# Patient Record
Sex: FEMALE | Race: WHITE | Employment: OTHER | URBAN - METROPOLITAN AREA
[De-identification: names, ages, dates, MRNs, and addresses within clinical notes are randomized per-mention and may not be internally consistent; named-entity substitution may affect disease eponyms.]

---

## 2017-01-25 RX ORDER — VENLAFAXINE HYDROCHLORIDE 150 MG/1
CAPSULE, EXTENDED RELEASE ORAL
Qty: 90 CAPSULE | Refills: 2 | Status: SHIPPED | OUTPATIENT
Start: 2017-01-25 | End: 2017-10-22 | Stop reason: SDUPTHER

## 2017-03-24 ENCOUNTER — TELEPHONE (OUTPATIENT)
Dept: NEUROLOGY | Age: 67
End: 2017-03-24

## 2017-07-31 ENCOUNTER — OFFICE VISIT (OUTPATIENT)
Dept: NEUROLOGY | Age: 67
End: 2017-07-31
Payer: MEDICARE

## 2017-07-31 VITALS
HEIGHT: 68 IN | HEART RATE: 72 BPM | DIASTOLIC BLOOD PRESSURE: 83 MMHG | SYSTOLIC BLOOD PRESSURE: 136 MMHG | WEIGHT: 142 LBS | BODY MASS INDEX: 21.52 KG/M2

## 2017-07-31 DIAGNOSIS — G35 RELAPSING REMITTING MULTIPLE SCLEROSIS (HCC): Primary | ICD-10-CM

## 2017-07-31 PROCEDURE — 99214 OFFICE O/P EST MOD 30 MIN: CPT | Performed by: PSYCHIATRY & NEUROLOGY

## 2017-08-09 ENCOUNTER — HOSPITAL ENCOUNTER (OUTPATIENT)
Dept: MRI IMAGING | Facility: CLINIC | Age: 67
Discharge: HOME | End: 2017-08-09
Payer: MEDICARE

## 2017-08-09 DIAGNOSIS — G35 RELAPSING REMITTING MULTIPLE SCLEROSIS (HCC): ICD-10-CM

## 2017-08-09 LAB — POC CREATININE: 0.7 MG/DL (ref 0.6–1.4)

## 2017-08-09 PROCEDURE — 82565 ASSAY OF CREATININE: CPT

## 2017-08-09 PROCEDURE — A9579 GAD-BASE MR CONTRAST NOS,1ML: HCPCS | Performed by: PSYCHIATRY & NEUROLOGY

## 2017-08-09 PROCEDURE — 70553 MRI BRAIN STEM W/O & W/DYE: CPT

## 2017-08-09 PROCEDURE — 72156 MRI NECK SPINE W/O & W/DYE: CPT

## 2017-08-09 PROCEDURE — 6360000004 HC RX CONTRAST MEDICATION: Performed by: PSYCHIATRY & NEUROLOGY

## 2017-08-09 RX ADMIN — GADOPENTETATE DIMEGLUMINE 13 ML: 469.01 INJECTION INTRAVENOUS at 09:50

## 2017-08-30 RX ORDER — INTERFERON BETA-1A 30MCG/.5ML
KIT INTRAMUSCULAR
Qty: 1 EACH | Refills: 11 | Status: SHIPPED | OUTPATIENT
Start: 2017-08-30 | End: 2018-02-19 | Stop reason: SDUPTHER

## 2017-09-18 PROBLEM — N39.0 RECURRENT UTI: Status: ACTIVE | Noted: 2017-09-18

## 2017-10-09 ENCOUNTER — OFFICE VISIT (OUTPATIENT)
Dept: NEUROLOGY | Age: 67
End: 2017-10-09
Payer: MEDICARE

## 2017-10-09 VITALS
SYSTOLIC BLOOD PRESSURE: 134 MMHG | HEIGHT: 68 IN | WEIGHT: 142 LBS | DIASTOLIC BLOOD PRESSURE: 79 MMHG | HEART RATE: 76 BPM | BODY MASS INDEX: 21.52 KG/M2

## 2017-10-09 DIAGNOSIS — G35 RELAPSING REMITTING MULTIPLE SCLEROSIS (HCC): Primary | ICD-10-CM

## 2017-10-09 PROCEDURE — 99213 OFFICE O/P EST LOW 20 MIN: CPT | Performed by: PSYCHIATRY & NEUROLOGY

## 2017-10-09 NOTE — PROGRESS NOTES
HPI:        Your patient, Danisha Barber returns for ongoing neurologic management of her long-standing relapsing remitting multiple sclerosis. She uses Avonex disease modifying therapy (DMT) which continues to control her condition very well. There have been no serious side effects from the medicine nor any recent relapses or signs of disease progression. Her most recent MRI of the brain from August 2017 revealed multiple T2 FLAIR hyperintense lesions within the supratentorial compartment compatible the clinical diagnosis of multiple sclerosis but compared to 2014 no new lesion or associated contrast enhancement or restricted diffusion was seen. A MRI of the cervical spine also from August 2017 revealed T2 hyperintense lesions which appeared similar to the prior exam with no new lesion or associated enhancement but degenerative changes contribute to minimal spinal canal stenosis at C6-C7. Rather than making a change in immunosuppressive therapy she wanted to give it some time and time has been good to her in that there have been no symptoms or features of active disease even by recent MR imaging. Nonetheless we always talk about the newer agents from the oral medications to stem cell therapy to the recent B-cell directed therapy, Ocrelizumab. She also uses symptomatic medicine, particularly Oxybutynin for a longstanding neurogenic bladder which still serves her purposes well. There have been no other medical or surgical issues nor any recent trauma, infection/fever or intoxication to complicate her multiple sclerosis condition.                                     REVIEW OF SYSTEMS    CONSTITUTIONAL Weight: absent, Appetite: absent, Fatigue: present       HEENT Ears: normal, Eyes: glasses and contacts, Visual disturbance: absent   RESPIRATORY Shortness of breath: absent, Cough: absent   CARDIOVASCULAR Chest pain: absent, Leg swelling :absent      GI Constipation: present, Diarrhea: absent, Swallowing change: absent       Urinary frequency: absent, Urinary urgency: absent, Urinary incontinence: absent   MUSCULOSKELETAL Neck pain: absent, Back pain: absent, Stiffness: absent, Muscle pain: absent, Joint pain: absent,  Restless legs: absent   DERMATOLOGIC Hair loss: absent, Skin changes: absent   NEUROLOGIC Memory loss: absent, Confusion: absent, Seizures: absent Trouble walking or imbalance: present, Dizziness: absent, Weakness: present, Numbness: present, Tremor: absent, Spasm: absent, Speech difficulty: absent, Headache: present, Light sensitivity: absent   PSYCHIATRIC Anxiety: absent, Hallucination: absent, Mood disorder: absent   HEMATOLOGIC Abnormal bleeding: absent, Anemia: absent, Clotting disorder: absent, Lymph gland changes: absent                   Outpatient Prescriptions Marked as Taking for the 10/9/17 encounter (Office Visit) with Rosana Murry MD   Medication Sig Dispense Refill    oxybutynin (DITROPAN-XL) 10 MG extended release tablet Take 1 tablet by mouth daily 90 tablet 3    AVONEX PEN 30 MCG/0.5ML AJKT INJECT 0.5 ML INTRAMUSCULARLY ONCE A WEEK 1 each 11    venlafaxine (EFFEXOR XR) 150 MG extended release capsule TAKE 1 CAPSULE DAILY 90 capsule 2    levothyroxine (SYNTHROID) 150 MCG tablet Take 1 tablet by mouth daily.  guaiFENesin (MUCINEX) 600 MG SR tablet Take 1,200 mg by mouth 2 times daily.  Cranberry 250 MG TABS Take  by mouth 2 times daily. Takes Cranactin from [facility] [place] ordered by urology                                          PHYSICAL EXAMINATION                                              .                                                                                                     General Appearance:  Alert, cooperative, no signs of distress, appears stated age and fit for age, well dressed and groomed   Head:  Normocephalic, no signs of trauma   Eyes:  Conjunctiva/corneas clear;  eyelids intact   Ears:  Normal external ear and canals   Nose: Nares normal, mucosa

## 2017-10-09 NOTE — COMMUNICATION BODY
change: absent       Urinary frequency: absent, Urinary urgency: absent, Urinary incontinence: absent   MUSCULOSKELETAL Neck pain: absent, Back pain: absent, Stiffness: absent, Muscle pain: absent, Joint pain: absent,  Restless legs: absent   DERMATOLOGIC Hair loss: absent, Skin changes: absent   NEUROLOGIC Memory loss: absent, Confusion: absent, Seizures: absent Trouble walking or imbalance: present, Dizziness: absent, Weakness: present, Numbness: present, Tremor: absent, Spasm: absent, Speech difficulty: absent, Headache: present, Light sensitivity: absent   PSYCHIATRIC Anxiety: absent, Hallucination: absent, Mood disorder: absent   HEMATOLOGIC Abnormal bleeding: absent, Anemia: absent, Clotting disorder: absent, Lymph gland changes: absent                   Outpatient Prescriptions Marked as Taking for the 10/9/17 encounter (Office Visit) with Eduar Desai MD   Medication Sig Dispense Refill    oxybutynin (DITROPAN-XL) 10 MG extended release tablet Take 1 tablet by mouth daily 90 tablet 3    AVONEX PEN 30 MCG/0.5ML AJKT INJECT 0.5 ML INTRAMUSCULARLY ONCE A WEEK 1 each 11    venlafaxine (EFFEXOR XR) 150 MG extended release capsule TAKE 1 CAPSULE DAILY 90 capsule 2    levothyroxine (SYNTHROID) 150 MCG tablet Take 1 tablet by mouth daily.  guaiFENesin (MUCINEX) 600 MG SR tablet Take 1,200 mg by mouth 2 times daily.  Cranberry 250 MG TABS Take  by mouth 2 times daily. Takes Cranactin from [facility] [place] ordered by urology                                          PHYSICAL EXAMINATION                                              .                                                                                                     General Appearance:  Alert, cooperative, no signs of distress, appears stated age and fit for age, well dressed and groomed   Head:  Normocephalic, no signs of trauma   Eyes:  Conjunctiva/corneas clear;  eyelids intact   Ears:  Normal external ear and canals   Nose: Nares normal, mucosa well.    There is no need for additional neurologic investigation or a change in therapy as far she is concerned. Consequently, I will remain available for questions or new neurologic issues that may arise and she will return for reevaluation in about 6 months time.

## 2017-10-23 RX ORDER — VENLAFAXINE HYDROCHLORIDE 150 MG/1
CAPSULE, EXTENDED RELEASE ORAL
Qty: 90 CAPSULE | Refills: 2 | Status: SHIPPED | OUTPATIENT
Start: 2017-10-23 | End: 2018-07-20 | Stop reason: SDUPTHER

## 2018-02-19 DIAGNOSIS — G35 RELAPSING REMITTING MULTIPLE SCLEROSIS (HCC): Primary | ICD-10-CM

## 2018-04-19 ENCOUNTER — OFFICE VISIT (OUTPATIENT)
Dept: NEUROLOGY | Age: 68
End: 2018-04-19
Payer: MEDICARE

## 2018-04-19 VITALS
HEART RATE: 76 BPM | HEIGHT: 68 IN | SYSTOLIC BLOOD PRESSURE: 147 MMHG | BODY MASS INDEX: 22.01 KG/M2 | DIASTOLIC BLOOD PRESSURE: 80 MMHG | WEIGHT: 145.2 LBS

## 2018-04-19 DIAGNOSIS — R53.83 OTHER FATIGUE: ICD-10-CM

## 2018-04-19 DIAGNOSIS — G35 RELAPSING REMITTING MULTIPLE SCLEROSIS (HCC): Primary | ICD-10-CM

## 2018-04-19 DIAGNOSIS — N31.9 NEUROGENIC BLADDER: ICD-10-CM

## 2018-04-19 DIAGNOSIS — F33.42 RECURRENT MAJOR DEPRESSIVE DISORDER, IN FULL REMISSION (HCC): ICD-10-CM

## 2018-04-19 PROCEDURE — 99215 OFFICE O/P EST HI 40 MIN: CPT | Performed by: PSYCHIATRY & NEUROLOGY

## 2018-04-30 LAB — VITAMIN D 1,25-DIHYDROXY: 18.6 PG/ML (ref 20–82)

## 2018-05-01 DIAGNOSIS — G35 RELAPSING REMITTING MULTIPLE SCLEROSIS (HCC): ICD-10-CM

## 2018-06-26 ENCOUNTER — TELEPHONE (OUTPATIENT)
Dept: NEUROLOGY | Age: 68
End: 2018-06-26

## 2018-06-26 DIAGNOSIS — G35 RELAPSING REMITTING MULTIPLE SCLEROSIS (HCC): ICD-10-CM

## 2018-06-26 DIAGNOSIS — E55.9 VITAMIN D DEFICIENCY: Primary | ICD-10-CM

## 2018-06-27 ENCOUNTER — TELEPHONE (OUTPATIENT)
Dept: NEUROLOGY | Age: 68
End: 2018-06-27

## 2018-06-27 NOTE — TELEPHONE ENCOUNTER
I had to do a PA for the Vit D3 50,000 units. It came back that it is not a covered medication with pt's insurance. Please advise, thanks.

## 2018-06-28 NOTE — TELEPHONE ENCOUNTER
That is the medication that I did the PA on, and it is coming back that it is not a covered medication.

## 2018-07-20 RX ORDER — VENLAFAXINE HYDROCHLORIDE 150 MG/1
CAPSULE, EXTENDED RELEASE ORAL
Qty: 90 CAPSULE | Refills: 0 | Status: SHIPPED | OUTPATIENT
Start: 2018-07-20 | End: 2020-08-24 | Stop reason: SINTOL

## 2018-07-26 DIAGNOSIS — G35 RELAPSING REMITTING MULTIPLE SCLEROSIS (HCC): ICD-10-CM

## 2018-08-29 ENCOUNTER — HOSPITAL ENCOUNTER (OUTPATIENT)
Age: 68
Setting detail: SPECIMEN
Discharge: HOME | End: 2018-08-29
Payer: MEDICARE

## 2018-08-31 LAB
CULTURE: ABNORMAL
CULTURE: ABNORMAL
Lab: ABNORMAL
ORGANISM: ABNORMAL
ORGANISM: ABNORMAL
SPECIMEN DESCRIPTION: ABNORMAL
STATUS: ABNORMAL

## 2018-11-13 ENCOUNTER — OFFICE VISIT (OUTPATIENT)
Dept: NEUROLOGY | Age: 68
End: 2018-11-13
Payer: MEDICARE

## 2018-11-13 VITALS
HEART RATE: 64 BPM | HEIGHT: 68 IN | BODY MASS INDEX: 21.92 KG/M2 | SYSTOLIC BLOOD PRESSURE: 162 MMHG | DIASTOLIC BLOOD PRESSURE: 88 MMHG | WEIGHT: 144.6 LBS

## 2018-11-13 DIAGNOSIS — E55.9 VITAMIN D DEFICIENCY: ICD-10-CM

## 2018-11-13 DIAGNOSIS — G35 RELAPSING REMITTING MULTIPLE SCLEROSIS (HCC): Primary | ICD-10-CM

## 2018-11-13 DIAGNOSIS — F33.42 RECURRENT MAJOR DEPRESSIVE DISORDER, IN FULL REMISSION (HCC): ICD-10-CM

## 2018-11-13 DIAGNOSIS — N31.9 NEUROGENIC BLADDER: ICD-10-CM

## 2018-11-13 DIAGNOSIS — R53.83 OTHER FATIGUE: ICD-10-CM

## 2018-11-13 PROCEDURE — 99214 OFFICE O/P EST MOD 30 MIN: CPT | Performed by: PSYCHIATRY & NEUROLOGY

## 2018-11-13 NOTE — PROGRESS NOTES
NEUROLOGY FOLLOW-UP  Patient Name:  Meliton Chand  :   1950  Clinic Visit Date: 2018        REVIEW OF SYSTEMS  Constitutional Weight changes: absent, Fevers : absent, Fatigue: present; Any recent hospitalizations:  absent.    HEENT Ears: normal, Eyes: glasses and contacts, Visual disturbance: absent   Reespiratory Shortness of breath: absent, Cough: absent   Cardivascular Chest pain: absent, Leg swelling :absent   GI Constipation: present, Diarrhea: absent, Swallowing change: absent    Urinary frequency: absent, Urinary urgency: absent, Urinary incontinence: absent   Musculoskeletal Neck pain: absent, Back pain: absent, Stiffness: absent, Muscle pain: absent, Joint pain: absent   Dermatological Hair loss: absent, Skin changes: absent   Neurological Memory loss: absent, Confusion: absent, Seizures: absent; Trouble walking or imbalance: present, Dizziness: absent, Weakness: present, Numbness present, Tremor: absent, Spasm: absent, Speech difficulty: absent, Headache: absent, Light sensitivity: absent   Psychiatric Anxiety: absent, Depression  absent, Suicidal ideations absent   Hematologic Abnormal bleeding: absent, Anemia: absent, Clotting disorder: absent, Lymph gland changes: absent
medulla, cervical cord at level of C2 and C4 vertebral bodies, at level of C2-C3, C5-C6 and C6-C7 disks as well as small enhancing MS plaque in posterior aspect of the cord at level of T1-T2 disc; also mild broad-based disc protrusions at C5-C6 and C6-C7 about the anterior contour of the cord and lead to mild spinal canal stenosis. MRI cervical spine August 2017 revealed T2 hyperintense lesions which appear similar to prior exam with no new lesions or associated enhancement but degenerative changes contributing to minimal spinal canal stenosis at C6-C7. Review of systems done by staff, Lyndsay Moss reviewed and pertinent positives include gait imbalance, weakness, spasms and fatigue, etc.    Current Outpatient Prescriptions on File Prior to Visit   Medication Sig Dispense Refill    oxybutynin (DITROPAN-XL) 10 MG extended release tablet TAKE 1 TABLET DAILY 90 tablet 3    Interferon Beta-1a (AVONEX PEN) 30 MCG/0.5ML AJKT INJECT 0.5 ML INTRAMUSCULARLY ONCE A WEEK 1 each 6    venlafaxine (EFFEXOR XR) 150 MG extended release capsule TAKE 1 CAPSULE DAILY 90 capsule 0    Cholecalciferol 400 UNIT TABS tablet Take 2 tablets by mouth daily Starting August 20th 2018. 30 tablet 3    levothyroxine (SYNTHROID) 150 MCG tablet Take 1 tablet by mouth daily.  guaiFENesin (MUCINEX) 600 MG SR tablet Take 1,200 mg by mouth 2 times daily.  Cranberry 250 MG TABS Take  by mouth 2 times daily. Takes Cranactin from [facility] ordered by urology       No current facility-administered medications on file prior to visit. Allergies: Barbara Raymond has No Known Allergies. Past Medical History:   Diagnosis Date    Caffeine use     1 coffee/day    Constipation     Depression, major, recurrent ([address]     Treated with EFFEXOR.  Hypothyroid 1996    Thyroid cancer surgically removed. Treated with LEVOXYL.     Irregular menses     resolved    Migraine with aura     Remote problem    Mononucleosis 1971    Caused Erythema

## 2019-08-02 ENCOUNTER — OFFICE VISIT (OUTPATIENT)
Dept: NEUROLOGY | Age: 69
End: 2019-08-02
Payer: MEDICARE

## 2019-08-02 VITALS
HEIGHT: 68 IN | HEART RATE: 66 BPM | WEIGHT: 138.2 LBS | SYSTOLIC BLOOD PRESSURE: 136 MMHG | BODY MASS INDEX: 20.95 KG/M2 | DIASTOLIC BLOOD PRESSURE: 79 MMHG

## 2019-08-02 DIAGNOSIS — N31.9 NEUROGENIC BLADDER: ICD-10-CM

## 2019-08-02 DIAGNOSIS — F33.42 RECURRENT MAJOR DEPRESSIVE DISORDER, IN FULL REMISSION (HCC): ICD-10-CM

## 2019-08-02 DIAGNOSIS — E55.9 VITAMIN D DEFICIENCY: ICD-10-CM

## 2019-08-02 DIAGNOSIS — G35 RELAPSING REMITTING MULTIPLE SCLEROSIS (HCC): Primary | ICD-10-CM

## 2019-08-02 PROCEDURE — 99214 OFFICE O/P EST MOD 30 MIN: CPT | Performed by: PSYCHIATRY & NEUROLOGY

## 2019-08-02 RX ORDER — THIAMINE HCL 100 MG
500 TABLET ORAL DAILY
COMMUNITY

## 2019-08-02 RX ORDER — LISINOPRIL 10 MG/1
10 TABLET ORAL DAILY
COMMUNITY
End: 2020-08-17

## 2019-08-02 RX ORDER — OXYBUTYNIN CHLORIDE 10 MG/1
TABLET, EXTENDED RELEASE ORAL
Qty: 90 TABLET | Refills: 3 | Status: SHIPPED | OUTPATIENT
Start: 2019-08-02 | End: 2019-08-19 | Stop reason: ALTCHOICE

## 2019-08-02 RX ORDER — LACTULOSE 20 G/30ML
SOLUTION ORAL
COMMUNITY
End: 2020-08-17

## 2019-08-06 LAB — VITAMIN D 25-HYDROXY: 51 NG/ML

## 2019-08-08 ENCOUNTER — TELEPHONE (OUTPATIENT)
Dept: NEUROLOGY | Age: 69
End: 2019-08-08

## 2020-02-04 ENCOUNTER — OFFICE VISIT (OUTPATIENT)
Dept: NEUROLOGY | Age: 70
End: 2020-02-04
Payer: MEDICARE

## 2020-02-04 VITALS
BODY MASS INDEX: 21.98 KG/M2 | WEIGHT: 145 LBS | DIASTOLIC BLOOD PRESSURE: 85 MMHG | HEART RATE: 70 BPM | SYSTOLIC BLOOD PRESSURE: 140 MMHG | HEIGHT: 68 IN

## 2020-02-04 PROCEDURE — 99214 OFFICE O/P EST MOD 30 MIN: CPT | Performed by: PSYCHIATRY & NEUROLOGY

## 2020-02-04 NOTE — PROGRESS NOTES
NEUROLOGY FOLLOW UP VISIT   Patient Name:       Burney Gilford  :        1950  Clinic Visit Date:    2020    Dear Dr. Laura Lott saw Ms. Burney Gilford in follow-up in the office today in continuation of neurologic care. As you know she  is a 81 y.o. right handed  female with relapsing remitting MS since  and on DMT with Avonex since ; patient never wanted to make any changes in her DMT. Now she comes to the clinic stating that she wants to know the status of multiple sclerosis. She still has ongoing gait difficulties and with altered sensation in both lower extremities waist down and it is unchanged. She still on daily doses of vitamin D. He admits to fatigue but she stated that she wants to be enrolled in a case [place] study and she does not want to be started on any medications at this point of time. She states that she tried amantadine and Ritalin in the past and she did not get much benefit. She denies blurred vision, double vision, weakness, vertigo, ataxia, memory disturbance. Multiple Sclerosis Disease Course  Onset of Symptoms: Date of onset:  (initial right optic neuritis along with lower extremity numbness and gait difficulties)   Date of diagnosis of MS:    Disease course at onset: Relapsing-Remitting  Current disease course: Relapsing-Remitting  Previous disease therapies: Avonex    Current disease therapy: Avonex ( -present)  CSF: done many yrs ago; complete details not available; old record notes say \"elevated IgG synthesis\". Vitamin D:  39.8 (2019)    Smoking status:  never smoked  EDSS: 2.5 with pyramidal dysfx & bladder dysfunction & gait disturbance    Of note; his symptoms of MS started in . She has had initial right optic neuritis along with lower extremity numbness and gait difficulties.   She has had abnormal VEP, MRI Brain and abnl CSF and then was diagnosed with relapsing remitting multiple sclerosis in   Her visual evoked potential testing in 1987 was abnormal on right side. Her spinal fluid studies from 1987 revealed increased IgG synthesis. She also had MRI brain in 90s with abnormal findings. Upon reviewing available studies;    MRI brain 12/2010 with plaque consistent with MS but no enhancing disease. MRI brain August 2017 revealed multiple T2 FLAIR hyperintense lesions within supratentorial compartment compatible with the clinical diagnosis of MS but compared to 2014 study; no new lesions or associated contrast enhancement or restricted diffusion seen. MRI cervical spine 1997 with C6 plaque. MRI C-spine 12/2014 with nonenhancing MS plaques in inferior medulla, cervical cord at level of C2 and C4 vertebral bodies, at level of C2-C3, C5-C6 and C6-C7 disks as well as small enhancing MS plaque in posterior aspect of the cord at level of T1-T2 disc; also mild broad-based disc protrusions at C5-C6 and C6-C7 about the anterior contour of the cord and lead to mild spinal canal stenosis. MRI cervical spine August 2017 revealed T2 hyperintense lesions which appear similar to prior exam with no new lesions or associated enhancement but degenerative changes contributing to minimal spinal canal stenosis at C6-C7. Review of systems done by staff reviewed and pertinent positives include balance difficulties, weakness, numbness, fatigue, etc. as stated above.       Current Outpatient Medications on File Prior to Visit   Medication Sig Dispense Refill    oxybutynin (DITROPAN-XL) 10 MG extended release tablet TAKE 1 TABLET DAILY 90 tablet 4    magnesium 200 MG TABS tablet Take 200 mg by mouth daily      COD LIVER OIL PO Take by mouth daily      Interferon Beta-1a (AVONEX PEN) 30 MCG/0.5ML AJKT INJECT 0.5 ML INTRAMUSCULARLY ONCE A WEEK 1 each 6    vitamin D (CHOLECALCIFEROL) 55601 UNIT CAPS Take 1 capsule by mouth once a week 8 capsule 0    venlafaxine (EFFEXOR XR) 150 MG extended release capsule TAKE 1 CAPSULE currently breastfeeding. GENERAL  Appears comfortable and in no distress   HEENT  NC/ AT   NECK  Supple and no bruits heard   MENTAL STATUS:  Alert, oriented, intact memory, no confusion, normal speech, normal language, no hallucination or delusion   CRANIAL NERVES: II     -      PERRLA, fundi reveal intact venous pulsations, visual fields intact to confrontation  III,IV,VI -  EOMs full, no afferent defect, no                      MAREK, no ptosis  V     -     Normal facial sensation  VII    -     Normal facial symmetry  VIII   -     Intact hearing  IX,X -     Symmetrical palate  XI    -     Symmetrical shoulder shrug  XII   -     Midline tongue, no atrophy    MOTOR FUNCTION:  significant for subtle weakness of grade 5-/5 in distal muscle groups of left lower extremity otherwise good strength of grade 5/5 in bilateral proximal and distal muscle groups of both upper and lower extremities with normal bulk, normal tone and no involuntary movements, no tremor   SENSORY FUNCTION:  Normal touch, normal pin, normal vibration, normal proprioception   CEREBELLAR FUNCTION:  Intact fine motor control over upper limbs   REFLEX FUNCTION:  Symmetric, no perverted reflex, no Babinski sign   STATION and GAIT  Normal station, Antalgic gait; could not do tandem gait      Diagnostic data reviewed with the patient:   MRI Brain (w/wo) (8/9/17): Multiple T2 FLAIR hyperintense lesions within the supratentorial compartment compatible with the clinical diagnosis of multiple sclerosis. No new lesions are clearly identified. No associated contrast enhancement or restricted diffusion noted. MRI Cervical Spine (8/9/17): T2 hyperintense lesions are seen in cervical cord; at C2, C5-C6, T2 level; which appear similar to the prior exam on 12/18/14. No new lesions clearly identified. No associated enhancement. Degenerative changes of cervical spine contributing to minimal spinal canal stenosis at C6-C7.   No spinal canal stenosis at remaining levels. Degenerative changes contributing to neuroforaminal narrowing noted. Visual evoked potential testing (1987): Abnormal on right side. CSF study (1987) abnormal with increased IgG synthesis. VITAMIN D 25 hydroxy level (4/26/18): 18.6                 Impression and Plan: Ms. Natividad Roach is a 81 y.o. female with     Relapsing remitting multiple sclerosis on DMT with Avonex since 1979; never wanted to make any changes in her DMT; today she is asking to know her MS disease status; wants to be enrolled in a care study for fatigue; will get MRI brain and spine (with/without). Vitamin D deficiency: continue cholecalciferol 1000 units once daily. Symptomatic therapy:   Depression, major, recurrent: stable on Effexor. Neurogenic bladder: stable on oxybutynin. Fatigue: Wants to be enrolled in a case study as stated above. Greater than 50% of visit was spent explaining the rationale for diagnosis and treatment. Discussion/counseling done regarding natural course of multiple sclerosis  She was asked to contact the clinic should she has signs and symptoms indicating exacerbation of multiple sclerosis and she verbalized understanding those instructions. Follow up in 6 months or sooner for further questions and concerns. Please note that portions of this note were completed with a voice recognition program.  Although every effort was made to ensure the accuracy of this  automated transcription, some errors in transcription may have occurred, occasionally words are mis-transcribed.

## 2020-02-04 NOTE — PROGRESS NOTES
NEUROLOGY FOLLOW-UP  Patient Name:  Jolie Conn  :   1950  Clinic Visit Date: 2020        REVIEW OF SYSTEMS  Constitutional Weight changes: absent, Fevers : absent, Fatigue: present; Any recent hospitalizations:  absent.    HEENT Ears: normal, Eyes: glasses, Visual disturbance: absent   Reespiratory Shortness of breath: absent, Cough: absent   Cardivascular Chest pain: absent, Leg swelling :absent   GI Constipation: present, Diarrhea: absent, Swallowing change: absent    Urinary frequency: absent, Urinary urgency: absent, Urinary incontinence: absent   Musculoskeletal Neck pain: absent, Back pain: absent, Stiffness: absent, Muscle pain: absent, Joint pain: absent   Dermatological Hair loss: absent, Skin changes: absent   Neurological Memory loss: absent, Confusion: absent, Seizures: absent; Trouble walking or imbalance: present, Dizziness: absent, Weakness: present, Numbness present, Tremor: absent, Spasm: absent, Speech difficulty: absent, Headache: absent, Light sensitivity: absent   Psychiatric Anxiety: absent, Depression  absent, Suicidal ideations absent   Hematologic Abnormal bleeding: absent, Anemia: absent, Clotting disorder: absent, Lymph gland changes: absent

## 2020-02-11 ENCOUNTER — HOSPITAL ENCOUNTER (OUTPATIENT)
Dept: MRI IMAGING | Facility: CLINIC | Age: 70
Discharge: HOME | End: 2020-02-13
Payer: MEDICARE

## 2020-02-11 LAB — POC CREATININE: 0.8 MG/DL (ref 0.6–1.4)

## 2020-02-11 PROCEDURE — 6360000004 HC RX CONTRAST MEDICATION: Performed by: PSYCHIATRY & NEUROLOGY

## 2020-02-11 PROCEDURE — 82565 ASSAY OF CREATININE: CPT

## 2020-02-11 PROCEDURE — 72156 MRI NECK SPINE W/O & W/DYE: CPT

## 2020-02-11 PROCEDURE — 70553 MRI BRAIN STEM W/O & W/DYE: CPT

## 2020-02-11 PROCEDURE — A9579 GAD-BASE MR CONTRAST NOS,1ML: HCPCS | Performed by: PSYCHIATRY & NEUROLOGY

## 2020-02-11 RX ADMIN — GADOTERIDOL 13 ML: 279.3 INJECTION, SOLUTION INTRAVENOUS at 09:40

## 2020-08-17 PROBLEM — D17.71 ANGIOMYOLIPOMA OF RIGHT KIDNEY: Status: ACTIVE | Noted: 2020-08-17

## 2020-08-23 NOTE — PROGRESS NOTES
NEUROLOGY FOLLOW UP VISIT   Patient Name:       Ballard Osgood  :        1950  Clinic Visit Date:    2020    Dear Dr. Vanesa Torre MD     I saw Ms. Ballard Osgood in follow-up in the office today in continuation of neurologic care. As you know she  is a 78 y.o. right handed  female with relapsing remitting MS since  and on DMT with Avonex since ; patient never wanted to make any changes in her DMT. Now she comes to the clinic stating that she has been off of Avonex since 22 and she never informed us. She still has ongoing gait difficulties and with altered sensation in both lower extremities waist down and it is unchanged. She still on daily doses of vitamin D. She still admits to fatigue and she is still does not want to be on any medications. She also stated that she has finished study at [facility]. She does not want to be on any medications for fatigue stating \"I have tried amantadine and Ritalin\"  in the past and she did not get much benefit. She denies blurred vision, double vision, weakness, vertigo, ataxia, memory disturbance. Multiple Sclerosis Disease Course  Onset of Symptoms: Date of onset:  (initial right optic neuritis along with lower extremity numbness and gait difficulties)   Date of diagnosis of MS:    Disease course at onset: Relapsing-Remitting  Current disease course: Relapsing-Remitting  Previous disease therapies: Avonex    Current disease therapy: Avonex ( -present)  CSF: done many yrs ago; complete details not available; old record notes say \"elevated IgG synthesis\". Vitamin D:  39.8 (2019)    Smoking status:  never smoked  EDSS: 2.5 with pyramidal dysfx & bladder dysfunction & gait disturbance    Of note; his symptoms of MS started in . She has had initial right optic neuritis along with lower extremity numbness and gait difficulties.   She has had abnormal VEP, MRI Brain and abnl CSF and then was diagnosed with relapsing remitting multiple sclerosis in 1979  Her visual evoked potential testing in 1987 was abnormal on right side. Her spinal fluid studies from 1987 revealed increased IgG synthesis. She also had MRI brain in 90s with abnormal findings. Upon reviewing available studies;    MRI brain 12/2010 with plaque consistent with MS but no enhancing disease. MRI brain August 2017 revealed multiple T2 FLAIR hyperintense lesions within supratentorial compartment compatible with the clinical diagnosis of MS but compared to 2014 study; no new lesions or associated contrast enhancement or restricted diffusion seen. MRI cervical spine 1997 with C6 plaque. MRI C-spine 12/2014 with nonenhancing MS plaques in inferior medulla, cervical cord at level of C2 and C4 vertebral bodies, at level of C2-C3, C5-C6 and C6-C7 disks as well as small enhancing MS plaque in posterior aspect of the cord at level of T1-T2 disc; also mild broad-based disc protrusions at C5-C6 and C6-C7 about the anterior contour of the cord and lead to mild spinal canal stenosis. MRI cervical spine August 2017 revealed T2 hyperintense lesions which appear similar to prior exam with no new lesions or associated enhancement but degenerative changes contributing to minimal spinal canal stenosis at C6-C7. Review of systems done by staff reviewed and pertinent positives include Balance difficulties, weakness, numbness, etc. as stated above.     Current Outpatient Medications on File Prior to Visit   Medication Sig Dispense Refill    oxybutynin (DITROPAN-XL) 10 MG extended release tablet TAKE 1 TABLET DAILY 90 tablet 4    magnesium 200 MG TABS tablet Take 200 mg by mouth daily      COD LIVER OIL PO Take by mouth daily      vitamin D (CHOLECALCIFEROL) 10497 UNIT CAPS Take 1 capsule by mouth once a week 8 capsule 0    venlafaxine (EFFEXOR XR) 150 MG extended release capsule TAKE 1 CAPSULE DAILY 90 capsule 0    levothyroxine II     -      PERRLA, fundi reveal intact venous pulsations, visual fields intact to confrontation  III,IV,VI -  EOMs full, no afferent defect, no                      MAREK, no ptosis  V     -     Normal facial sensation  VII    -     Normal facial symmetry  VIII   -     Intact hearing  IX,X -     Symmetrical palate  XI    -     Symmetrical shoulder shrug  XII   -     Midline tongue, no atrophy    MOTOR FUNCTION:  significant for subtle weakness of grade 5-/5 in distal muscle groups of left lower extremity otherwise good strength of grade 5/5 in bilateral proximal and distal muscle groups of both upper and lower extremities with normal bulk, normal tone and no involuntary movements, no tremor   SENSORY FUNCTION:  Normal touch, normal pin, normal vibration, normal proprioception   CEREBELLAR FUNCTION:  Intact fine motor control over upper limbs   REFLEX FUNCTION:  Symmetric, no perverted reflex, no Babinski sign   STATION and GAIT  Normal station, Antalgic gait; could not do tandem gait      Diagnostic data reviewed with the patient:   MRI Brain (w/wo) (8/9/17): Multiple T2 FLAIR hyperintense lesions within the supratentorial compartment compatible with the clinical diagnosis of multiple sclerosis. No new lesions are clearly identified. No associated contrast enhancement or restricted diffusion noted. MRI Cervical Spine (8/9/17): T2 hyperintense lesions are seen in cervical cord; at C2, C5-C6, T2 level; which appear similar to the prior exam on 12/18/14. No new lesions clearly identified. No associated enhancement. Degenerative changes of cervical spine contributing to minimal spinal canal stenosis at C6-C7. No spinal canal stenosis at remaining levels. Degenerative changes contributing to neuroforaminal narrowing noted. Visual evoked potential testing (1987): Abnormal on right side. CSF study (1987) abnormal with increased IgG synthesis.     VITAMIN D 25 hydroxy level (4/26/18): 18.6     MRI sclerosis and she verbalized understanding those instructions. Follow up in 3 months. Please note that portions of this note were completed with a voice recognition program.  Although every effort was made to ensure the accuracy of this automated transcription, some errors in transcription may have occurred; occasionally words are mis-transcribed. Thank you for understanding.

## 2020-08-24 ENCOUNTER — OFFICE VISIT (OUTPATIENT)
Dept: NEUROLOGY | Age: 70
End: 2020-08-24
Payer: MEDICARE

## 2020-08-24 VITALS
DIASTOLIC BLOOD PRESSURE: 85 MMHG | SYSTOLIC BLOOD PRESSURE: 157 MMHG | BODY MASS INDEX: 23.04 KG/M2 | HEIGHT: 68 IN | TEMPERATURE: 97.4 F | WEIGHT: 152 LBS | HEART RATE: 66 BPM

## 2020-08-24 PROBLEM — F32.A DEPRESSION: Status: ACTIVE | Noted: 2020-08-24

## 2020-08-24 PROBLEM — R03.0 ELEVATED BLOOD PRESSURE READING: Status: ACTIVE | Noted: 2020-08-24

## 2020-08-24 PROBLEM — R53.83 OTHER FATIGUE: Status: ACTIVE | Noted: 2020-08-24

## 2020-08-24 PROCEDURE — 99214 OFFICE O/P EST MOD 30 MIN: CPT | Performed by: PSYCHIATRY & NEUROLOGY

## 2020-08-24 RX ORDER — VENLAFAXINE HYDROCHLORIDE 75 MG/1
75 CAPSULE, EXTENDED RELEASE ORAL DAILY
Qty: 30 CAPSULE | Refills: 0 | Status: SHIPPED | OUTPATIENT
Start: 2020-08-24 | End: 2020-09-24

## 2020-08-24 NOTE — PROGRESS NOTES
NEUROLOGY FOLLOW-UP  Patient Name:  Jose M Thompson  :   1950  Clinic Visit Date: 2020        REVIEW OF SYSTEMS  Constitutional Weight changes: absent, Fevers : absent, Fatigue: absent; Any recent hospitalizations:  absent.    HEENT Ears: normal, Eyes: no correction, Visual disturbance: absent   Reespiratory Shortness of breath: absent, Cough: absent   Cardivascular Chest pain: absent, Leg swelling :absent   GI Constipation: absent, Diarrhea: absent, Swallowing change: absent    Urinary frequency: absent, Urinary urgency: absent, Urinary incontinence: absent   Musculoskeletal Neck pain: absent, Back pain: absent, Stiffness: absent, Muscle pain: absent, Joint pain: absent   Dermatological Hair loss: absent, Skin changes: absent   Neurological Memory loss: absent, Confusion: absent, Seizures: absent; Trouble walking or imbalance: present, Dizziness: absent, Weakness: present, Numbness present, Tremor: absent, Spasm: absent, Speech difficulty: absent, Headache: absent, Light sensitivity: absent   Psychiatric Anxiety: absent, Depression  absent, Suicidal ideations absent   Hematologic Abnormal bleeding: absent, Anemia: absent, Clotting disorder: absent, Lymph gland changes: absent

## 2020-09-21 ENCOUNTER — TELEPHONE (OUTPATIENT)
Dept: NEUROLOGY | Age: 70
End: 2020-09-21

## 2020-09-21 NOTE — TELEPHONE ENCOUNTER
Patient called the office back this afternoon with an update. She stated that her average BP has been 140/75 while taking the lower dose (75 mg) of venlafaxine. BP's were taken mainly in the morning, typically just once a day. Patient also stated that she only has 3 pills left (of the lower dose). Please advise.

## 2020-09-24 NOTE — TELEPHONE ENCOUNTER
Pharmacy requesting refill of Effexor.       Medication active on med list yes      Date of last fill: 8/24/20  verified on 9/24/2020   verified by [facility] LPN      Date of last appointment 8/24/20    Next Visit Date:  11/4/2020

## 2020-09-25 RX ORDER — VENLAFAXINE HYDROCHLORIDE 75 MG/1
CAPSULE, EXTENDED RELEASE ORAL
Qty: 30 CAPSULE | Refills: 2 | Status: SHIPPED | OUTPATIENT
Start: 2020-09-25 | End: 2020-11-04 | Stop reason: SDUPTHER

## 2020-09-25 NOTE — TELEPHONE ENCOUNTER
Called the patient and spoke to her and her most recent bp around 130/70  She has been checking her pressures at home and they are as above. She also added \" my blood pressures are always high whenever I visit doctors offices\".     Thank you.   -dr. José Luis Levine

## 2020-09-28 NOTE — TELEPHONE ENCOUNTER
Patient called the office back this afternoon and this information was given to her. Patient verbally stated her understanding.

## 2020-11-04 ENCOUNTER — OFFICE VISIT (OUTPATIENT)
Dept: NEUROLOGY | Age: 70
End: 2020-11-04
Payer: MEDICARE

## 2020-11-04 VITALS
BODY MASS INDEX: 23.49 KG/M2 | HEIGHT: 68 IN | SYSTOLIC BLOOD PRESSURE: 165 MMHG | DIASTOLIC BLOOD PRESSURE: 88 MMHG | HEART RATE: 70 BPM | WEIGHT: 155 LBS | TEMPERATURE: 97.9 F

## 2020-11-04 PROCEDURE — 99214 OFFICE O/P EST MOD 30 MIN: CPT | Performed by: PSYCHIATRY & NEUROLOGY

## 2020-11-04 RX ORDER — VENLAFAXINE HYDROCHLORIDE 75 MG/1
CAPSULE, EXTENDED RELEASE ORAL
Qty: 30 CAPSULE | Refills: 2 | Status: SHIPPED | OUTPATIENT
Start: 2020-11-04 | End: 2021-04-05

## 2020-11-04 NOTE — PROGRESS NOTES
NEUROLOGY FOLLOW UP VISIT   Patient Name:       Wolfgang Duarte  :        1950  Clinic Visit Date:    2020  LOV: 20    Dear Dr. Delila Collet, MD     I saw Ms. Wolfgang Duarte in follow-up in the office today in continuation of neurologic care. As you know she  is a 78 y.o. right handed  female with relapsing remitting MS since  and on DMT with Avonex since ; she never wanted to change her DMT. Now she comes to the clinic stating that she has been off of Avonex since 22 and she never informed us. She has been taking all the precautions during this COVID-19 pandemic including social distancing and using mask in public places. She still has ongoing gait difficulties and with altered sensation in both lower extremities waist down and it is unchanged. She still on daily doses of vitamin D. She still admits to fatigue and she is still does not want to be on any medications. She also stated that she has finished study at [facility]. Waiting for the test results. She does not want to be on any medications for fatigue stating \"I have tried amantadine and Ritalin\"  in the past and she did not get much benefit. She denies blurred vision, double vision, weakness, vertigo, ataxia, memory disturbance. Multiple Sclerosis Disease Course  Onset of Symptoms: Date of onset:  (initial right optic neuritis along with lower extremity numbness and gait difficulties)   Date of diagnosis of MS:    Disease course at onset: Relapsing-Remitting  Current disease course: Relapsing-Remitting  Previous disease therapies: Avonex    Current disease therapy: Avonex ( -present)  CSF: done many yrs ago; complete details not available; old record notes say \"elevated IgG synthesis\".    Vitamin D:  39.8 (2019)    Smoking status:  never smoked  EDSS: 2.5 with pyramidal dysfx & bladder dysfunction & gait disturbance    Of note; his symptoms of MS started mouth daily      COD LIVER OIL PO Take by mouth daily      vitamin D (CHOLECALCIFEROL) 27387 UNIT CAPS Take 1 capsule by mouth once a week 8 capsule 0    levothyroxine (SYNTHROID) 150 MCG tablet Take 1 tablet by mouth daily.  Cranberry 250 MG TABS Take  by mouth 2 times daily. Takes Cranactin from [facility] ordered by urology       No current facility-administered medications on file prior to visit. Allergies: Bridger Villarreal is allergic to no known allergies. Past Medical History:   Diagnosis Date    Caffeine use     1 coffee/day    Constipation     Depression, major, recurrent ([address]     Treated with EFFEXOR.  Diverticulitis     Hypertension     Hypothyroid 1996    Thyroid cancer surgically removed. Treated with LEVOXYL.  Irregular menses     resolved    Migraine with aura     Remote problem    Mononucleosis 1971    Caused Erythema Nodosum    Multiple sclerosis ([address]     Osteoporosis     Treated with FOSAMAX.  Relapsing remitting multiple sclerosis (HCC)     [facility] [facility]     Urinary incontinence     Urinary retention     Vaginal atrophy        Past Surgical History:   Procedure Laterality Date    COLONOSCOPY      DILATION AND CURETTAGE  2/2005    with h/s    THYROIDECTOMY      Removed in 2 surgeries due to cancer.  TONSILLECTOMY       Social History: Bridger Villarreal  reports that she has never smoked. She has never used smokeless tobacco. She reports current alcohol use. She reports that she does not use drugs. Family History   Problem Relation Age of Onset    Other Father         stroke, Diabetes    Other Mother         Thyroid disease    Other Maternal Grandmother         Colon Ca    Other Maternal Grandfather         Colon Ca     On exam: vitals: Blood pressure (!) 165/88, pulse 70, temperature 97.9 °F (36.6 °C), height 5' 8\" (1.727 m), weight 155 lb (70.3 kg), not currently breastfeeding.   checking bp at home: it was 151/91; it has been running around 140s    GENERAL  Appears comfortable and in no distress   HEENT  NC/ AT   NECK  Supple and no bruits heard   MENTAL STATUS:  Alert, oriented, intact memory, no confusion, normal speech, normal language, no hallucination or delusion   CRANIAL NERVES: II     -      PERRLA, fundi reveal intact venous pulsations, visual fields intact to confrontation  III,IV,VI -  EOMs full, no afferent defect, no                      MAREK, no ptosis  V     -     Normal facial sensation  VII    -     Normal facial symmetry  VIII   -     Intact hearing  IX,X -     Symmetrical palate  XI    -     Symmetrical shoulder shrug  XII   -     Midline tongue, no atrophy    MOTOR FUNCTION:  significant for subtle weakness of grade 5-/5 in distal muscle groups of left lower extremity otherwise good strength of grade 5/5 in bilateral proximal and distal muscle groups of both upper and lower extremities with normal bulk, normal tone and no involuntary movements, no tremor   SENSORY FUNCTION:  Normal touch, normal pin, normal vibration, normal proprioception   CEREBELLAR FUNCTION:  Intact fine motor control over upper limbs   REFLEX FUNCTION:  Symmetric, no perverted reflex, no Babinski sign   STATION and GAIT  Normal station, Antalgic gait; could not do tandem gait      Diagnostic data reviewed with the patient:   MRI Brain (w/wo) (8/9/17): Multiple T2 FLAIR hyperintense lesions within the supratentorial compartment compatible with the clinical diagnosis of multiple sclerosis. No new lesions are clearly identified. No associated contrast enhancement or restricted diffusion noted. MRI Cervical Spine (8/9/17): T2 hyperintense lesions are seen in cervical cord; at C2, C5-C6, T2 level; which appear similar to the prior exam on 12/18/14. No new lesions clearly identified. No associated enhancement. Degenerative changes of cervical spine contributing to minimal spinal canal stenosis at C6-C7. No spinal canal stenosis at remaining levels. Degenerative changes contributing to neuroforaminal narrowing noted. Visual evoked potential testing (1987): Abnormal on right side. CSF study (1987) abnormal with increased IgG synthesis. VITAMIN D 25 hydroxy level (4/26/18): 18.6     MRI brain (with/without) 2/11/2020: Study is compared with 8/9/2017 study; demyelinating lesions are present consistent with history of MS.  No evidence for active or acute demyelination.         MRI cervical spine (with/without) 2/11/2020: Study is compared with 8/9/2017 study; demyelinating lesions in the cervical spinal cord is consistent with multiple sclerosis.  But no evidence for active or acute demyelination              Impression and Plan: Ms. Heber Dawkins is a 78 y.o. female with     Relapsing remitting multiple sclerosis on DMT with Avonex since 1979 until Feb 2020; stopped Avonex since Feb 2020; stopped it on her own; does not want to be on any new DMT agents. Repeat brain MRI and cervical spine MRI on 2/11/2020 showed demyelinating lesions but no evidence for active or acute demyelination. Asymptomatic hypertension; most likely related to venlafaxine. ,elevated blood pressure reading: Upon reviewing her blood pressure readings at home; she stated that her blood pressure has been running 150s at home on most of the days and diastolic about 30-65. She denies any symptoms such as headache, cp, etc. She has decreased the dose of venlafaxine from 150 mg to 75 mg qd and depression is under control. Uncontrolled hypertension; reviewed blood pressure readings; mostly beyond 140s/ 90s Patient is advised to contact her PCP for hypertension therapy. Completed the fatigue study through [facility]; study completed and she is waiting for the results. Vitamin D deficiency: continue cholecalciferol 1000 units once daily. Symptomatic therapy:   Depression, major, recurrent: stable on Effexor.   Neurogenic bladder: stable; off of oxybutynin and presently taking Trospium SR 60 mg qd; under care of Dr. Lilliam Marr. Fatigue: has completed the case study at [facility]; results awaiting; does not want to be on any medication for it. Follow up in 6 months. Please note that portions of this note were completed with a voice recognition program.  Although every effort was made to ensure the accuracy of this automated transcription, some errors in transcription may have occurred; occasionally words are mis-transcribed. Thank you for understanding. Addendum:  Called the patient on 11/5/2020 and d/w her re: blood pressures; she has been checking on those and already made an appt with PCP.     Marybeth Wills MD 11/5/2020 10:41 AM

## 2021-04-03 DIAGNOSIS — F32.A DEPRESSION, UNSPECIFIED DEPRESSION TYPE: ICD-10-CM

## 2021-04-03 DIAGNOSIS — G35 RELAPSING REMITTING MULTIPLE SCLEROSIS (HCC): ICD-10-CM

## 2021-04-05 NOTE — TELEPHONE ENCOUNTER
Aleksandra Angulo  Please let the patient know that we need to have rpt bp; earlier in the clinic it was high. Can you please ask her to check herself and let us know; if they are normal (<130/90); I can send the script refill.    Thank you.   -dr. Milly Armstrong

## 2021-04-06 RX ORDER — VENLAFAXINE HYDROCHLORIDE 75 MG/1
CAPSULE, EXTENDED RELEASE ORAL
Qty: 30 CAPSULE | Refills: 1 | Status: SHIPPED | OUTPATIENT
Start: 2021-04-06 | End: 2021-05-20 | Stop reason: SDUPTHER

## 2021-05-20 ENCOUNTER — OFFICE VISIT (OUTPATIENT)
Dept: NEUROLOGY | Age: 71
End: 2021-05-20
Payer: MEDICARE

## 2021-05-20 VITALS
HEIGHT: 68 IN | SYSTOLIC BLOOD PRESSURE: 162 MMHG | HEART RATE: 67 BPM | DIASTOLIC BLOOD PRESSURE: 81 MMHG | BODY MASS INDEX: 24.4 KG/M2 | WEIGHT: 161 LBS

## 2021-05-20 DIAGNOSIS — F32.A DEPRESSION, UNSPECIFIED DEPRESSION TYPE: Primary | ICD-10-CM

## 2021-05-20 DIAGNOSIS — E55.9 VITAMIN D DEFICIENCY: ICD-10-CM

## 2021-05-20 DIAGNOSIS — G35 RELAPSING REMITTING MULTIPLE SCLEROSIS (HCC): ICD-10-CM

## 2021-05-20 DIAGNOSIS — R29.818 DIFFICULTY BALANCING: ICD-10-CM

## 2021-05-20 DIAGNOSIS — E53.9 VITAMIN B DEFICIENCY: ICD-10-CM

## 2021-05-20 PROCEDURE — 99214 OFFICE O/P EST MOD 30 MIN: CPT | Performed by: PSYCHIATRY & NEUROLOGY

## 2021-05-20 RX ORDER — VENLAFAXINE HYDROCHLORIDE 75 MG/1
CAPSULE, EXTENDED RELEASE ORAL
Qty: 30 CAPSULE | Refills: 1 | Status: SHIPPED | OUTPATIENT
Start: 2021-05-20 | End: 2021-09-28 | Stop reason: SDUPTHER

## 2021-05-20 RX ORDER — AMLODIPINE BESYLATE 5 MG/1
TABLET ORAL
COMMUNITY
Start: 2021-02-12

## 2021-05-20 RX ORDER — LACTULOSE 10 G/15ML
SOLUTION ORAL
COMMUNITY
End: 2021-08-25

## 2021-05-20 NOTE — PROGRESS NOTES
NEUROLOGY FOLLOW UP VISIT   Patient Name:       Maria Isabel Blanco  :        1950  Clinic Visit Date:    2021  LOV: 2020      Dear Dr. Aydee Arthur MD     I saw Ms. Maria Isabel Blanco in follow-up in the office today in continuation of neurologic care. As you know she  is a 73 y.o. right handed  female with relapsing remitting MS since  and on DMT with Avonex since ; she never wanted to change her DMT. Now she comes to the clinic stating that she has been off of Avonex since 20 and she never informed us. She has been taking all the precautions during this COVID-19 pandemic including social distancing and using mask in public places. She comes to the clinic stating that she is suffering from severe fatigue and it is unprovoked and     She still has ongoing gait difficulties and with altered sensation in both lower extremities waist down and it is unchanged. She still on daily doses of vitamin D. She still admits to fatigue and she is still does not want to be on any medications. She also stated that she has finished study at [facility]. Waiting for the test results. She does not want to be on any medications for fatigue stating \"I have tried amantadine and Ritalin\"  in the past and she did not get much benefit. She denies blurred vision, double vision, weakness, vertigo, ataxia, memory disturbance. Multiple Sclerosis Disease Course  Onset of Symptoms: Date of onset:  (initial right optic neuritis along with lower extremity numbness and gait difficulties)   Date of diagnosis of MS:    Disease course at onset: Relapsing-Remitting  Current disease course: Relapsing-Remitting  Previous disease therapies: Avonex    Current disease therapy: Avonex ( -present)  CSF: done many yrs ago; complete details not available; old record notes say \"elevated IgG synthesis\".    Vitamin D:  39.8 (2019)    Smoking status:  never smoked  EDSS: 2.5 with pyramidal dysfx & bladder dysfunction & gait disturbance    Of note; his symptoms of MS started in 1979. She has had initial right optic neuritis along with lower extremity numbness and gait difficulties. She has had abnormal VEP, MRI Brain and abnl CSF and then was diagnosed with relapsing remitting multiple sclerosis in 1979  Her visual evoked potential testing in 1987 was abnormal on right side. Her spinal fluid studies from 1987 revealed increased IgG synthesis. She also had MRI brain in 90s with abnormal findings. Upon reviewing available studies;    MRI brain 12/2010 with plaque consistent with MS but no enhancing disease. MRI brain August 2017 revealed multiple T2 FLAIR hyperintense lesions within supratentorial compartment compatible with the clinical diagnosis of MS but compared to 2014 study; no new lesions or associated contrast enhancement or restricted diffusion seen. MRI cervical spine 1997 with C6 plaque. MRI C-spine 12/2014 with nonenhancing MS plaques in inferior medulla, cervical cord at level of C2 and C4 vertebral bodies, at level of C2-C3, C5-C6 and C6-C7 disks as well as small enhancing MS plaque in posterior aspect of the cord at level of T1-T2 disc; also mild broad-based disc protrusions at C5-C6 and C6-C7 about the anterior contour of the cord and lead to mild spinal canal stenosis. MRI cervical spine August 2017 revealed T2 hyperintense lesions which appear similar to prior exam with no new lesions or associated enhancement but degenerative changes contributing to minimal spinal canal stenosis at C6-C7. All items selected indicate a positive finding. Those items not selected are negative.   Constitutional [] Weight loss/gain   [x] Fatigue  [] Fever/Chills   HEENT [] Hearing Loss  [] Visual Disturbance  [] Tinnitus  [] Eye pain   Respiratory [] Shortness of Breath  [] Cough  [] Snoring   Cardiovascular [] Chest Pain  [] Palpitations  [] Lightheaded   GI [] Constipation  [] Diarrhea  [] Swallowing change    [] Urinary Frequency  [] Urinary Urgency   Musculoskeletal [] Neck pain  [] Back pain  [] Muscle pain  [] Restless legs   Dermatologic [] Skin changes   Neurologic [] Memory loss/confusion  [] Seizures  [x] Trouble walking or imbalance  [] Dizziness  [x] Weakness  [x] Numbness  [] Tremors  [] Speech Difficulty  [] Headaches  [] Light Sensitivity  [] Sound Sensitivity   Endocrinology []Excessive thirst  []Excessive hunger   Psychiatric [] Anxiety/Depression  [] Hallucination   Allergy/immunology []Hives/environmental allergies   Hematologic/lymph [] Abnormal bleeding  [] Abnormal bruising         Current Outpatient Medications on File Prior to Visit   Medication Sig Dispense Refill    Vitamins A & D (VITAMIN A & D) 5000-400 units CAPS Take by mouth      lactulose (CHRONULAC) 10 GM/15ML solution lactulose 10 gram/15 mL oral solution      amLODIPine (NORVASC) 5 MG tablet       venlafaxine (EFFEXOR XR) 75 MG extended release capsule TAKE ONE CAPSULE BY MOUTH DAILY 30 capsule 1    oxybutynin (DITROPAN-XL) 10 MG extended release tablet Take 1 tablet by mouth daily 90 tablet 3    VITAMIN D PO Take 1,000 Units by mouth 2 times daily      magnesium 200 MG TABS tablet Take 200 mg by mouth daily      COD LIVER OIL PO Take by mouth daily      levothyroxine (SYNTHROID) 150 MCG tablet Take 1 tablet by mouth daily.  Cranberry 250 MG TABS Take  by mouth 2 times daily. Takes Cranactin from [facility] ordered by urology       No current facility-administered medications on file prior to visit. Allergies: Eryntabitha Glynn is allergic to no known allergies. Past Medical History:   Diagnosis Date    Caffeine use     1 coffee/day    Constipation     Depression, major, recurrent ([facility] [address]     Treated with EFFEXOR.  Diverticulitis     Hypertension     Hypothyroid 1996    Thyroid cancer surgically removed. Treated with LEVOXYL.     Irregular menses     resolved    Migraine with aura     Remote problem    Mononucleosis 1971    Caused Erythema Nodosum    Multiple sclerosis ([facility] [address]     Osteoporosis     Treated with FOSAMAX.  Relapsing remitting multiple sclerosis (HCC)     Thyroid cancer ([facility] [address]     Urinary incontinence     Urinary retention     Vaginal atrophy        Past Surgical History:   Procedure Laterality Date    COLONOSCOPY      DILATION AND CURETTAGE  2/2005    with h/s    THYROIDECTOMY      Removed in 2 surgeries due to cancer.  TONSILLECTOMY       Social History: Rolly Brown  reports that she has never smoked. She has never used smokeless tobacco. She reports current alcohol use. She reports that she does not use drugs. Family History   Problem Relation Age of Onset    Other Father         stroke, Diabetes    Other Mother         Thyroid disease    Other Maternal Grandmother         Colon Ca    Other Maternal Grandfather         Colon Ca     On exam: vitals: Blood pressure (!) 154/83, pulse 65, height 5' 8\" (1.727 m), weight 161 lb (73 kg), not currently breastfeeding. checking bp at home: it was 132/73 this morning and it has been 120-130/ 70-80.     GENERAL  Appears comfortable and in no distress   HEENT  NC/ AT   NECK  Supple and no bruits heard   MENTAL STATUS:  Alert, oriented, intact memory, no confusion, normal speech, normal language, no hallucination or delusion   CRANIAL NERVES: II     -      PERRLA, fundi reveal intact venous pulsations, visual fields intact to confrontation  III,IV,VI -  EOMs full, no afferent defect, no                      MAREK, no ptosis  V     -     Normal facial sensation  VII    -     Normal facial symmetry  VIII   -     Intact hearing  IX,X -     Symmetrical palate  XI    -     Symmetrical shoulder shrug  XII   -     Midline tongue, no atrophy    MOTOR FUNCTION:  significant for subtle weakness of grade 5-/5 in distal muscle groups of left lower extremity otherwise good strength of grade 5/5 in bilateral proximal and distal muscle groups of both upper and lower extremities with normal bulk, normal tone and no involuntary movements, no tremor   SENSORY FUNCTION:  decreased LT, PP, temp and vibration in both lower extremities   CEREBELLAR FUNCTION:  Intact fine motor control over upper limbs   REFLEX FUNCTION:  Symmetric, no perverted reflex, no Babinski sign   STATION and GAIT  Normal station, Antalgic gait; could not do tandem gait      Diagnostic data reviewed with the patient:   MRI Brain (w/wo) (8/9/17): Multiple T2 FLAIR hyperintense lesions within the supratentorial compartment compatible with the clinical diagnosis of multiple sclerosis. No new lesions are clearly identified. No associated contrast enhancement or restricted diffusion noted. MRI Cervical Spine (8/9/17): T2 hyperintense lesions are seen in cervical cord; at C2, C5-C6, T2 level; which appear similar to the prior exam on 12/18/14. No new lesions clearly identified. No associated enhancement. Degenerative changes of cervical spine contributing to minimal spinal canal stenosis at C6-C7. No spinal canal stenosis at remaining levels. Degenerative changes contributing to neuroforaminal narrowing noted. Visual evoked potential testing (1987): Abnormal on right side. CSF study (1987) abnormal with increased IgG synthesis.     VITAMIN D 25 hydroxy level (4/26/18): 18.6     MRI brain (with/without) 2/11/2020: Study is compared with 8/9/2017 study; demyelinating lesions are present consistent with history of MS.  No evidence for active or acute demyelination.         MRI cervical spine (with/without) 2/11/2020: Study is compared with 8/9/2017 study; demyelinating lesions in the cervical spinal cord is consistent with multiple sclerosis.  But no evidence for active or acute demyelination              Impression and Plan: Ms. Rick Banks is a 73 y.o. female with     Relapsing remitting multiple sclerosis on DMT with Avonex since 1979 until Feb 2020; stopped Avonex since Feb 2020; stopped it on her own; does not want to be on any new DMT agents. Repeat brain MRI and cervical spine MRI on 2/11/2020 showed demyelinating lesions but no evidence for active or acute demyelination. Asymptomatic hypertension; most likely related to venlafaxine 75 mg qd. . ,elevated blood pressure reading: Upon reviewing her blood pressure readings at home; she stated that her blood pressure has been running 120-130/70-80. She denies any symptoms such as headache, cp, etc.    Completed the fatigue study through [facility]; study completed and she is waiting for the results. Vitamin D deficiency: continue cholecalciferol 1000 units once daily. Symptomatic therapy:   Depression, major, recurrent: stable on Effexor. Neurogenic bladder: stable; off of oxybutynin and presently taking Trospium SR 60 mg qd; under care of Dr. Stevo Harrison. Fatigue: tried Amantadine and Ritalin without any relief; has completed the case study at [facility]; she did not get any reports yet; results awaiting; does not want to be on any medication for it.       Follow up in

## 2021-05-28 ENCOUNTER — HOSPITAL ENCOUNTER (OUTPATIENT)
Age: 71
Discharge: HOME | End: 2021-05-28
Payer: MEDICARE

## 2021-05-28 DIAGNOSIS — E55.9 VITAMIN D DEFICIENCY: ICD-10-CM

## 2021-05-28 DIAGNOSIS — E53.9 VITAMIN B DEFICIENCY: ICD-10-CM

## 2021-05-28 DIAGNOSIS — G35 RELAPSING REMITTING MULTIPLE SCLEROSIS (HCC): ICD-10-CM

## 2021-05-28 LAB
FOLATE: 14.6 NG/ML
VITAMIN B-12: 467 PG/ML (ref 232–1245)
VITAMIN D 25-HYDROXY: 63.4 NG/ML (ref 30–100)

## 2021-05-28 PROCEDURE — 82746 ASSAY OF FOLIC ACID SERUM: CPT

## 2021-05-28 PROCEDURE — 84207 ASSAY OF VITAMIN B-6: CPT

## 2021-05-28 PROCEDURE — 36415 COLL VENOUS BLD VENIPUNCTURE: CPT

## 2021-05-28 PROCEDURE — 86038 ANTINUCLEAR ANTIBODIES: CPT

## 2021-05-28 PROCEDURE — 82306 VITAMIN D 25 HYDROXY: CPT

## 2021-05-28 PROCEDURE — 82607 VITAMIN B-12: CPT

## 2021-06-01 ENCOUNTER — HOSPITAL ENCOUNTER (OUTPATIENT)
Dept: PHYSICAL THERAPY | Facility: CLINIC | Age: 71
Setting detail: THERAPIES SERIES
Discharge: HOME | End: 2021-06-01
Payer: MEDICARE

## 2021-06-01 LAB
ANTI-NUCLEAR ANTIBODY (ANA): NEGATIVE
VITAMIN B6: 110.1 NMOL/L (ref 20–125)

## 2021-06-01 PROCEDURE — 97161 PT EVAL LOW COMPLEX 20 MIN: CPT

## 2021-06-01 NOTE — FLOWSHEET NOTE
Functional Gait Assessment (FGA)    1. Gait Level Surface: 2 (6.88 s)  Instructions: Walk at your normal speed from here to the next nina (6 m/20 ft)  Grading: [facility] the highest category that applies. (3) Normal - Walks 6 m (20 ft) in less than 5.5 seconds, no assistive devices, good speed, no evidence for imbalance, normal gait pattern, deviates no more than 6 in. outside of the 12        in walkway width. (2) Mild impairment - Walks 6 in (20 ft) in less than 7 seconds but greater than 5.5 seconds, uses assistive device, slower speed, mild gait deviations, or deviates 6-10 in. outside of        the 12 in walkway width. (1) Moderate impairment - Walks 6 m (20 ft) slow speed, abnormal gait pattern, evidence for imbalance, or deviates 10-15 in outside of the 12 in walkway width. Requires more than 7       seconds to ambulate 6 m (20 ft). (0) Significant impairment - Cannot walk 6 m (20 ft) without assistance, severe gait deviations or imbalance, deviates greater than 15 in. outside of the 12 in. walkway width or        reaches and touches the wall. 2. Change in Gait Speed: 3  Instructions: Begin walking at your normal pace (for 1.5 m (5ft)). When I tell you \"go\", walk as fast as you can (for 1.5m (5 ft)). When I tell you \"slow\", walk as slowly as you can (for 1.5m (5ft)). Grading: [facility] the highest category that applies. (3) Normal - Able to smoothly change walking speed without loss of balance or gait deviation. Shows a significant difference in walking speeds between normal, fast, and slow speeds. Deviates no more than 6 in. outside of the 12 in. walkway width. (2) Mild impairment - Is able to change speed but demonstrates mild gait deviations, deviates 6-10 in. outside of the 12 in. walkway width, or no gait deviations but        unable to achieve a significant change in velocity, or uses an assistive device.    (1) Moderate impairment - Makes only minor adjustments to walking speed, or accomplishes impairment - Is able to step over one shoe box (4.5 in. Total height) but must slow dwon and adjust steps to clear box safely. May require verbal cueing.   (0) Severe impairment - Cannot perform without assistance. 7. Gait with Narrow Base of Support: 0  Instructions: Walk on the floor with arms folded across the chest, feet aligned heel to toe in tandem for a distance of 12 ft. The number of steps taken in a striaght line are counted for a maximum of 10 steps. Grading: [facility] the highest category that applies. (3) Normal - Is able to ambulate for 10 steps heel to toe with no staggering.   (2) Mild impairment - Ambulates 7-9 steps. (1) Moderate impairment - Ambulates 4-7 steps. (0) Severe impairment - Ambulates less than 4 steps heel to toe or cannot perform without assistance. 8. Gait with Eyes Closed: 1  Instructions: Walk at your normal speed from here to the next nina (6 m, 20 ft) with your eyes closed. Grading: [facility] the highest category that applies. (3) Normal - Walks 6 m (20 ft), no assistive devices, good speed, no evidence of imbalance, normal gait pattern, deviates no more than 6 in outside 12 in. walkway width. Ambulates        6 m (20ft) in less than 7 seconds. (2) Mild impairment - Walks 6 m (20 ft), uses assistive device, slower speed, mild gait deviations, deviates 6-10 in. outside 12 in. walkway width. Ambulates 6 m (20 ft) in less than 9        seconds but greater than 7 seconds. (1) Moderate impairment - Walks 6 m (20 ft), slow speed, abnormal gait pattern, evidence for imbalance, deviates 10-15 in. outside 12 in. walkway width. Requires more than 9        seconds to ambulate 6 m (20 ft). (0) Severe impairment - Cannot walk 6 m (20 ft) without assistance, severe gait deviations or imbalance, deviates greater than 15 in. outside 12 in. walkway width or will not attempt        task. 9. Ambulating Backwards: 1  Instructions: Walk backwards until I tell you to stop.   Grading:

## 2021-06-01 NOTE — CONSULTS
[] [facility] ([facility]) - [facility] &  Therapy  [address]    [phone]  F: [phone] [x] [address]  [facility]   [address]  P: [phone]  F: [phone] [] [place]  [address]  P: [phone]  F: [phone] [] [address]  [facility]   [address]   P: [phone]  F: [phone] [] [facility] ([facility]) - [facility] & Therapy  [address]  [place]: [phone]   F: [phone]        Physical Therapy Neurological Evaluation    Date:  2021  Patient: Johney Rinne  : 1950  MRN: [id number]  Physician: Dr. Johnson Payment: [address] [facility] [address]. Honey Aguilar after eval)  Medical Diagnosis: Relapsing remitting multiple sclerosis; Difficulty balancing  Rehab Codes: R53.1, R29.3, R26.9, R27.9  Next 's appt.: 21  Date of onset: 21    Subjective:   CC: Pt arrives for physical therapy evaluation of impairments secondary to RRMS - denies any recent relapse, most recent in  where she reports she now has altered sensation from waist down into BLEs. States she was referred to therapy d/t balance/gait issues noted by neurologist at most recent visit. Denies any use of AD except does report difficulty when at grocery store and has to use cart, and when walking for exercise will use walking poles. Denies any falls, but does note minor trips around the house where she's been able to catch herself. Seeing urologist for bowel/bladder, being controlled per pt. Does note she has to take frequent breaks with house hold chores, \"a nap is required\".      Pertinent medical hx: R knee pain, possible knee replacement; L medial ankle pain - has gotten insert but never found shoes to wear      PLOF: still with chronic balance/gait deficits with no falls, minimal of function Who currently assists the patient with task   Bathing  [x] Independent  [] Assist [x] Independent  [] Assist Grab bars in shower, stands   Dress/grooming [x] Independent  [] Assist [x] Independent  [] Assist    Transfer/mobility [x] Independent  [] Assist [x] Independent  [] Assist    Feeding [x] Independent  [] Assist [x] Independent  [] Assist    Toileting [x] Independent  [] Assist [x] Independent  [] Assist    Driving [x] Independent  [] Assist [x] Independent  [] Assist    Housekeeping [x] Independent  [] Assist [x] Independent  [] Assist Regularly takes breaks/nap   Grocery shop/meal prep [x] Independent  [] Assist [x] Independent  [] Assist      Gait Prior level of function Current level of function    [x] Independent  [] Assist [x] Independent  [] Assist   Device: [x] Independent [x] Independent    [] Straight Cane [] Quad cane [] Straight Cane [] Quad cane    [] Standard walker [] Rolling walker   [] 4 wheeled walker [] Standard walker [] Rolling walker   [] 4 wheeled walker    [] Wheelchair [] Wheelchair         Pain:  [x] Yes  [] No Location: R knee/L ankle Pain Rating: (0-10 scale) 7/10  Pain altered Tx:  [] Yes  [x] No  Action:    Symptoms:  [] Improving [] Worsening [x] Same  Better:  Rest, elevated legs  Worse: increased activity  Sleep: [x] OK    [] Disturbed         Previous physical therapy?    [x]No       []Yes, date of most recent, facility:       Durable Medical Equipment:  Current DME available: grab bars, walking poles      Objective:    ROM  °A/P STRENGTH POSTURE No deficit Deficit Not Tested Comments    Left Right Left Right Kyphosis [x] [] []    Shoulder Flex     Scoliosis [x] [] []    Abd     Forward Head [x] [] []    Elbow Flex     Rounded Shldrs [] [x] []    Ext     Slumped Sitting [x] [] []    Wrist Flex     Skin Integrity [x] [] []    Ext            Hand      NEUROLOGICAL       Hip Flex WNL WNL 4+ 4+ Reflexes [] [] []    Ext   4+ 5- Sensation [] [x] [] L3-on is \"numb\" Abd WNL WNL 4 4 Bladder/Bowel [] [x] [] Doctor aware   Knee Flex WNL WNL 5- 5- Coordination [x] [] []    Ext WNL WNL 5- 5- Tone [x] [] []    Ankle DF 5 5 5- 5- Clonus [x] [] []    PF WNL WNL 5- 5- Tremors [x] [] []      FUNCTION INDEP MIN ASSIST MOD ASSIST MAX ASSIST NOT TESTED   Bed Mobility        -rolling [x] [] [] [] []   -sit to supine [x] [] [] [] []   -supine to sit [x] [] [] [] []   Transfers        -sit to stand [x] [] [] [] []   -sliding board [] [] [] [] [x]   -pivot [] [] [] [] [x]   Balance        -sitting static [x] [] [] [] []   -dynamic [x] [] [] [] []   -standing static [x] [] [] [] []   -dynamic [x] [] [] [] []   Ambulation [x] [] [] [] []   Distance/Device: 60 ft, no device   Analysis: Compensated L trendelenburg, more narrow CLIFFORD with sometimes lacking straight path; appropriate heel strike, hip flexion in swing     W/C Mobility [] [] [] [] [x]   Groom/Dress [] [] [] [] [x]     Core set neurological outcome measures: [] Mattson Balance Scale:   [x] 10mWT: 9.6s self selected, 8.89 s fast    Gait speed: 1.04 m/s self selected, 1.12 m/s fast (reduced community ambulation speeds for age)        [x] FGA: 18/30 (<22/30 indicates increased fall risk)  [x] 5x STS: 20.45s (<12 indicates increased fall risk)  [] 6MWT:         Comments: Self reported outcome measure not given - will provide in upcoming session. Assessment: Jeanie Long is a 72 yo female who presents with gait instability and dynamic balance impairment secondary to RRMS, with additional bilat hip ext/abd/flex weakness (minor weakness) and ankle DF reduced ROM. Pt will benefit from skilled physical therapy to address these     Problems:    [x] ? Pain:  [x] ? ROM:   [x] ? Strength:  [x] ?  Function:  [x] Other:  Balance, gait impairment     STG: (to be met in 8 treatments)  1. ? Pain: No more than 6/10 max pain in R knee/L ankle with therapy to indicate good tolerance to increased physical activity  2. ? ROM: At least 10deg DF PROM bilat to improve gait/stair mechanics and allow improving lunge mobility for yoga classes/kneel to stand transfers  3. ? Balance: Able to complete tandem amb for 25 ft with only min error (~25%) with keeping toes directly on line to indicate improving anticipatory balance in narrow conditions  4. ? Strength: At least 5-/5 gross bilat hip strength in all planes to allow for improving stability in SLS with gait and improve gait mechanics/efficiency  5. ? Function:   a. Pt will be able to complete household chores without rest break for at least 45 min consecutively to indicate improving functional mobility tolerance  b. Pt will be able to squat to ground to lift 25# and carry while ambulating using BUEs without balance impairment noted to improve safety with carrying/lifting grandkids  6. Patient to be independent with home exercise program as demonstrated by performance with correct form without cues. 7. Demonstrate Knowledge of fall prevention      LTG: (to be met in 16 treatments)  1. FGA to at least 24/30 to indicate clinically significant improvement in dynamic balance within gait and reduce fall risk  2. 5x STS to no more than 15s to indicate improving overall BLE strength/power for functional transfers  3. Gait speed to at least 1.2 m/s self selected to indicate improving overall balance, safety, and progressing towards improved community speeds on level surfaces  4.  Pt reports ability to resume yoga classes, regular walking without concern for balance to encourage regular aerobic exercise completion      Patient goals: \"do yoga, balance better\"    Rehab Potential:  [x] Good  [] Fair  [] Poor   Suggested Professional Referral:  [x] No  [] Yes:  Barriers to Goal Achievement[de-identified]  [x] No  [] Yes:  Domestic Concerns:  [x] No  [] Yes:    Pt. Education:  [x] Plans/Goals, Risks/Benefits discussed  [] Home exercise program  Method of Education: [x] Verbal  [] Demo  [] Written  Comprehension of Education:  [x] [place] understanding. [] Demonstrates understanding. [] Needs Review. [] Demonstrates/verbalizes understanding of HEP/Ed previously given. Treatment Plan:  [x] Therapeutic Exercise   61512  [] Iontophoresis: 4 mg/mL Dexamethasone Sodium Phosphate  mAmin  13469   [x] Therapeutic Activity  49089 [] Vasopneumatic cold with compression  02519    [x] Gait Training   05874 [] Ultrasound   28744   [x] Neuromuscular Re-education  02016 [] Electrical Stimulation Unattended  37910   [x] Manual Therapy  69604 [] Electrical Stimulation Attended  54971   [x] Instruction in HEP  [] Dry Needling   [] Aquatic Therapy   16910 [] Cold/hotpack    [] Massage   27457      [] Lumbar/Cervical Traction  03981     []  Medication allergies reviewed for use of    Dexamethasone Sodium Phosphate 4mg/ml     with iontophoresis treatments. Pt is not allergic.       Frequency: 2 x/weeks for 16 visits    Todays Treatment:  Modalities:   Exercises:  Exercise Reps/ Time Weight/ Level Comments                                 Other: HELD all therex to allow for thorough neurological evaluation    Specific Instructions for next treatment:   - mat table hip strenghtening: bicycles, SLR, resisted SL slams, SL hip abd, SL bridge  - narrow CLIFFORD balance holds: tandem stance w/ finger tip touch, single limb stance, tandem amb; add on head movements and EC/foam as able  - amb train with increased step length, more typical CLIFFORD, upright trunk - while on foam, head turns, holding heavy object, step overs, etc    Evaluation Complexity:  History (Personal factors, comorbidities) [] 0 [x] 1-2 [] 3+   Exam (limitations, restrictions) [] 1-2 [] 3 [x] 4+   Clinical presentation (progression) [x] Stable [] Evolving  [] Unstable   Decision Making [x] Low [] Moderate [] High    [x] Low Complexity [] Moderate Complexity [] High Complexity       Treatment Charges: Mins Units   [x] Evaluation       [x]  Low       []  Moderate       []  High 47 1   []  Modalities     [] Ther Exercise     []  Manual Therapy     []  Ther Activities     []  Aquatics     []  Vasocompression     []  Other       TOTAL TREATMENT TIME: 47 min    Time in:9:35 am      Time out: 10:21 am    Electronically signed by: Renaldo Soriano PT        Physician Signature:________________________________Date:__________________  By signing above or cosigning this note, I have reviewed this plan of care and certify a need for medically necessary rehabilitation services.      *PLEASE SIGN ABOVE AND FAX BACK ALL PAGES*

## 2021-07-16 ENCOUNTER — HOSPITAL ENCOUNTER (OUTPATIENT)
Dept: PHYSICAL THERAPY | Facility: CLINIC | Age: 71
Setting detail: THERAPIES SERIES
Discharge: HOME | End: 2021-07-16
Payer: MEDICARE

## 2021-07-16 PROCEDURE — 97116 GAIT TRAINING THERAPY: CPT

## 2021-07-16 PROCEDURE — 97112 NEUROMUSCULAR REEDUCATION: CPT

## 2021-07-16 PROCEDURE — 97110 THERAPEUTIC EXERCISES: CPT

## 2021-07-16 NOTE — FLOWSHEET NOTE
[] [facility]) [facility] &  Therapy  [address]  [phone]  F: [phone] [] [address]  [facility]   [address]  P: [phone]  F: [phone] [] [address] &  Therapy  [address]  P: [phone]  F: [phone] [] [address]  P: [phone]  F: [phone] [] [address]  [facility]   [address]   [place]: [phone]  F: [phone]      Physical Therapy Daily Treatment Note    Date:  2021  Patient Name:  Wolfgang Duarte    :  1950  MRN: [id number]  Physician: Dr. Miri Zheng: [employer] ([address] [place] [address])  Medical Diagnosis: Relapsing remitting multiple sclerosis; Difficulty balancing  Rehab Codes: R53.1, R29.3, R26.9, R27.9  Next 's appt.: 21  Date of onset: 21  Visit# / total visits: ; Progress note for Medicare patient due at visit 10     Cancels/No Shows: 0    Subjective:    Pain:  [] Yes  [x] No Location: N/A Pain Rating: (0-10 scale) 0/10  Pain altered Tx:  [x] No  [] Yes  Action:  Comments: Pt notes no changes since evaluation.      Objective:  Modalities:   Precautions:  Exercises:  Exercise Reps/ Time Weight/ Level Comments   Upright bike 5 min L3          Mat      SLR 2x10 ea blueberry Easy, hold next   Bicycles 2x10 ea     SL bridge 2x10 ea  SL  = single leg   Sidelying clams 2x15 ea blueberry    Sidelying hip abd   ADD NEXT   Sidelying plank 5x10\" ea           Gym      Tandem stance 4x20\" ea     SLS 4x20\"     SLS taps to cones 5x ea  3 cones: lateral, anterior, medial    Fwd heel tap to step 2x10 12\"    Lateral foot tap to step 10x ea 12\"    Step up w/ knee drive   ADD NEXT   Tandem stance w/ ball raises   ADD NEXT   Tandem amb  // bars ADD NEXT Half kneel to stand   ADD SOON         Gait train 450 ft  VCs for typical CLIFFORD, upright trunk   Fwd weightshift into SLS with glute squeeze 2x10 ea // bars Focus on upright trunk vs compensated trendellenburg                Other:        Treatment Charges: Mins Units   []  Modalities     [x]  Ther Exercise 25 2   []  Manual Therapy     []  Ther Activities     []  Aquatics     []  Vasocompression     [x]  Other: Neuro 22 1   [x]  Other: Gait 8 1   Total Treatment time 55 4       Assessment: [x] Progressing toward goals. Initiated treatment this date focused on B hip strengthening, standing dynamic balance, and gait training. Worked through various mat table exercises for OKC hip strengthening focusing on finding appropriate level resistance challenge; challenged with tandem/SLS this date, added to HEP. Noted to have narrow CLIFFORD, slower gait speed, and compensated trendelenburg on L - difficult to correct with verbal cues, so broke down into just anterior weightshift with focus on glute activation. Still requires min VCs to complete and needs min UE assist in // bars as well. [] No change. [] Other:  [x] Patient would continue to benefit from skilled physical therapy services in order to: Progress BLE hip strength, address gait mechanics, increase balance strategies and single limb stance stability, and further improve functional mobility including safe/controlled kneel to stand transfers and return to yoga classes.      STG: (to be met in 8 treatments)  1. ? Pain: No more than 6/10 max pain in R knee/L ankle with therapy to indicate good tolerance to increased physical activity  2. ? ROM: At least 10deg DF PROM bilat to improve gait/stair mechanics and allow improving lunge mobility for yoga classes/kneel to stand transfers  3. ? Balance: Able to complete tandem amb for 25 ft with only min error (~25%) with keeping toes directly on line to indicate improving anticipatory balance in narrow conditions  4. ? Strength: At least 5-/5 gross bilat hip strength in all planes to allow for improving stability in SLS with gait and improve gait mechanics/efficiency  5. ? Function:   a. Pt will be able to complete household chores without rest break for at least 45 min consecutively to indicate improving functional mobility tolerance  b. Pt will be able to squat to ground to lift 25# and carry while ambulating using BUEs without balance impairment noted to improve safety with carrying/lifting grandkids  6. Patient to be independent with home exercise program as demonstrated by performance with correct form without cues. 7. Demonstrate Knowledge of fall prevention        LTG: (to be met in 16 treatments)  1. FGA to at least 24/30 to indicate clinically significant improvement in dynamic balance within gait and reduce fall risk  2. 5x STS to no more than 15s to indicate improving overall BLE strength/power for functional transfers  3. Gait speed to at least 1.2 m/s self selected to indicate improving overall balance, safety, and progressing towards improved community speeds on level surfaces  4. Pt reports ability to resume yoga classes, regular walking without concern for balance to encourage regular aerobic exercise completion        Patient goals: \"do yoga, balance better\"    Pt. Education:  [x] Yes  [] No  [x] Reviewed Prior HEP/Ed  Method of Education: [x] Verbal  [x] Demo  [x] Written  Comprehension of Education:  [x] Verbalizes understanding. [x] Demonstrates understanding. [] Needs review. [] Demonstrates/verbalizes HEP/Ed previously given. Plan: [x] Continue current frequency toward long and short term goals.     [x] Specific Instructions for subsequent treatments: progress SLS stability L>R      Time In:8:00 am            Time Out: 9:04 am    Electronically signed by:  Cristina Stanley, PT

## 2021-07-28 ENCOUNTER — APPOINTMENT (OUTPATIENT)
Dept: PHYSICAL THERAPY | Facility: CLINIC | Age: 71
End: 2021-07-28
Payer: MEDICARE

## 2021-07-30 ENCOUNTER — APPOINTMENT (OUTPATIENT)
Dept: PHYSICAL THERAPY | Facility: CLINIC | Age: 71
End: 2021-07-30
Payer: MEDICARE

## 2021-08-04 ENCOUNTER — HOSPITAL ENCOUNTER (OUTPATIENT)
Dept: PHYSICAL THERAPY | Facility: CLINIC | Age: 71
Setting detail: THERAPIES SERIES
Discharge: HOME | End: 2021-08-04
Payer: MEDICARE

## 2021-08-04 PROCEDURE — 97112 NEUROMUSCULAR REEDUCATION: CPT

## 2021-08-04 PROCEDURE — 97116 GAIT TRAINING THERAPY: CPT

## 2021-08-04 NOTE — FLOWSHEET NOTE
[] [facility]) - [facility] &  Therapy  [address]  [phone]  F: [phone] [] [address]  [place] [address]   [address]  P: [phone]  F: [phone] [] [address] &  Therapy  [address]  P: [phone]  F: [phone] [] [address]  P: [phone]  F: [phone] [] [address]  [facility]   [address]   [place]: [phone]  F: [phone]      Physical Therapy Daily Treatment Note    Date:  2021  Patient Name:  Wolfgang Duarte    :  1950  MRN: [id number]  Physician: Dr. Miri Zheng: [employer] ([address] [place] [address])  Medical Diagnosis: Relapsing remitting multiple sclerosis; Difficulty balancing  Rehab Codes: R53.1, R29.3, R26.9, R27.9  Next 's appt.: 21  Date of onset: 21  Visit# / total visits: ; Progress note for Medicare patient due at visit 10     Cancels/No Shows: 0    Subjective:    Pain:  [] Yes  [x] No Location: N/A Pain Rating: (0-10 scale) 0/10  Pain altered Tx:  [x] No  [] Yes  Action:  Comments: Pt reports she was busy last week and unable to make appts so she cancelled; also didn't have time to do HEP as well.       Objective:  Modalities:   Precautions:  Exercises:  Exercise Reps/ Time Weight/ Level Comments   Upright bike 5 min L3          Mat      Bicycles      SL bridge   SL  = single leg   Sidelying clams  blueberry    Sidelying hip abd   ADD NEXT   Sidelying plank            Gym      Tandem stance 4x20\" ea     SLS 4x20\"     SLS taps to cones 5x ea  3 cones: lateral, anterior, medial    Fwd heel tap to step 20x 12\"    Lateral foot tap to step 2x10 ea 12\"    Step up w/ knee drive 0J85 ea     CKC hip hike 2x15 ea     Tandem stance w/ ball raises   ADD NEXT   Tandem amb 5 min // bars Progressed to only unilat UE assist   Half kneel to stand 2x15 R  x15 L           Gait train 1200  ft  VCs for typical CLIFFORD, upright trunk; walking along line to give external cue for wider stance   Fwd weightshift into SLS with glute squeeze  // bars Focus on upright trunk vs compensated trendellenburg                Other:        Treatment Charges: Mins Units   []  Modalities     [x]  Ther Exercise 4 --   []  Manual Therapy     []  Ther Activities     []  Aquatics     []  Vasocompression     [x]  Other: Neuro 33 2   [x]  Other: Gait 8 1   Total Treatment time 45 3       Assessment: [x] Progressing toward goals. Continued focus on single limb stance stability and training - does frequently utilize UE assist intermittently throughout tasks to regain balance. Will work on additional core strengthening next visit to improve stability. Still with mod instability when in SLS bilat (L>R) and compensated trendelenburg noted on LLE. Improves cross-over LE with ambulation when cued to use line along track to keep wider stance throughout gait - less instability with this gait training. [] No change. [] Other:  [x] Patient would continue to benefit from skilled physical therapy services in order to: Progress BLE hip strength, address gait mechanics, increase balance strategies and single limb stance stability, and further improve functional mobility including safe/controlled kneel to stand transfers and return to yoga classes.      STG: (to be met in 8 treatments)  1. ? Pain: No more than 6/10 max pain in R knee/L ankle with therapy to indicate good tolerance to increased physical activity  2. ? ROM: At least 10deg DF PROM bilat to improve gait/stair mechanics and allow improving lunge mobility for yoga classes/kneel to stand transfers  3. ? Balance: Able to complete tandem amb for 25 ft with only min error (~25%) with keeping toes directly on line to indicate improving anticipatory balance in narrow conditions  4. ? Strength: At least 5-/5 gross bilat hip strength in all planes to allow for improving stability in SLS with gait and improve gait mechanics/efficiency  5. ? Function:   a. Pt will be able to complete household chores without rest break for at least 45 min consecutively to indicate improving functional mobility tolerance  b. Pt will be able to squat to ground to lift 25# and carry while ambulating using BUEs without balance impairment noted to improve safety with carrying/lifting grandkids  6. Patient to be independent with home exercise program as demonstrated by performance with correct form without cues. 7. Demonstrate Knowledge of fall prevention        LTG: (to be met in 16 treatments)  1. FGA to at least 24/30 to indicate clinically significant improvement in dynamic balance within gait and reduce fall risk  2. 5x STS to no more than 15s to indicate improving overall BLE strength/power for functional transfers  3. Gait speed to at least 1.2 m/s self selected to indicate improving overall balance, safety, and progressing towards improved community speeds on level surfaces  4. Pt reports ability to resume yoga classes, regular walking without concern for balance to encourage regular aerobic exercise completion        Patient goals: \"do yoga, balance better\"    Pt. Education:  [x] Yes  [] No  [x] Reviewed Prior HEP/Ed  Method of Education: [x] Verbal  [x] Demo  [x] Written  Comprehension of Education:  [x] Verbalizes understanding. [x] Demonstrates understanding. [] Needs review. [] Demonstrates/verbalizes HEP/Ed previously given. Plan: [x] Continue current frequency toward long and short term goals.     [x] Specific Instructions for subsequent treatments: progress SLS stability L>R      Time In: 10:03 am            Time Out: 10:56 am    Electronically signed by:  Genia Cunha, PT

## 2021-08-06 ENCOUNTER — HOSPITAL ENCOUNTER (OUTPATIENT)
Dept: PHYSICAL THERAPY | Facility: CLINIC | Age: 71
Setting detail: THERAPIES SERIES
Discharge: HOME | End: 2021-08-06
Payer: MEDICARE

## 2021-08-06 PROCEDURE — 97110 THERAPEUTIC EXERCISES: CPT

## 2021-08-06 PROCEDURE — 97112 NEUROMUSCULAR REEDUCATION: CPT

## 2021-08-06 NOTE — FLOWSHEET NOTE
[] [facility]) [facility] &  Therapy  [address]  [phone]  F: [phone] [x] [address]  [facility]   [address]  P: [phone]  F: [phone] [] [address] &  Therapy  [address]  P: [phone]  F: [phone] [] [address]  P: [phone]  F: [phone] [] [address]  [facility]   [address]   [place]: [phone]  F: [phone]      Physical Therapy Daily Treatment Note    Date:  2021  Patient Name:  Gerry Blanton    :  1950  MRN: [id number]  Physician: Dr. Rdz Mantel: [facility] Medicare ([address] [place] [address])  Medical Diagnosis: Relapsing remitting multiple sclerosis; Difficulty balancing  Rehab Codes: R53.1, R29.3, R26.9, R27.9  Next 's appt.: 21  Date of onset: 21  Visit# / total visits: ; Progress note for Medicare patient due at visit 10     Cancels/No Shows: 0    Subjective:    Pain:  [] Yes  [x] No Location: N/A Pain Rating: (0-10 scale) 0/10  Pain altered Tx:  [x] No  [] Yes  Action:  Comments: Pt reports she completed mat table HEP at home after last session and did well. Notes she's fatigued slightly from lots of uphill walking yesterday.      Objective:  Modalities:   Precautions:  Exercises:  Exercise Reps/ Time Weight/ Level Comments   Upright bike 6 min L4          Mat      Bicycles      SL bridge   SL  = single leg   Sidelying clams  blueberry    Sidelying hip abd   ADD NEXT   Sidelying plank            Gym      Half Tandem stance 4x20\" ea     1st position tandem stance 3x30\" ea  Easier than full tandem for pt   Tandem stance x  Attempted, too much UE assist   SLS 4x20\"     SLS taps to cones 5x ea  3 cones: lateral, anterior, medial    Fwd heel tap to step 2x20 12\" Holding 4# ball overhead   Lateral foot tap to step 2x10 ea 12\"    Step up w/ knee drive 0E28 ea     CKC hip hike 2x15 ea     Tandem amb 5 min // bars Progressed to only unilat UE assist         Gait train 600  ft  VCs for typical CLIFFORD, upright trunk; walking along line to give external cue for wider stance                     Half kneel to stand      Core twists 2x15 ea purple Arms extended out straight, hands together   Wood chops  purple ADD         Other:        Treatment Charges: Mins Units   []  Modalities     [x]  Ther Exercise 9 1   []  Manual Therapy     []  Ther Activities     []  Aquatics     []  Vasocompression     [x]  Other: Neuro 37 2   [x]  Other: Gait 5 --   Total Treatment time 51 3       Assessment: [x] Progressing toward goals. Continued focus on single limb stance stability and training - does frequently utilize UE assist intermittently throughout tasks to regain balance - cued pt to just use more constant fingertip support to allow proper neuromuscular learning vs tapping to regain balance. Does actually demo more stability in SLS on LLE this date more so than RLE. Min tactile cues for proper glute med activation in RLE CKC hip hike. Does still demo limited SLS stability and would benefit from additional therapy. [] No change. [] Other:  [x] Patient would continue to benefit from skilled physical therapy services in order to: Progress BLE hip strength, address gait mechanics, increase balance strategies and single limb stance stability, and further improve functional mobility including safe/controlled kneel to stand transfers and return to yoga classes.      STG: (to be met in 8 treatments)  1. ? Pain: No more than 6/10 max pain in R knee/L ankle with therapy to indicate good tolerance to increased physical activity  2. ? ROM: At least 10deg DF PROM bilat to improve gait/stair mechanics and allow improving lunge mobility for yoga classes/kneel to stand transfers  3. ? Balance: Able to complete tandem amb for 25 ft with only min error (~25%) with keeping toes directly on line to indicate improving anticipatory balance in narrow conditions  4. ? Strength: At least 5-/5 gross bilat hip strength in all planes to allow for improving stability in SLS with gait and improve gait mechanics/efficiency  5. ? Function:   a. Pt will be able to complete household chores without rest break for at least 45 min consecutively to indicate improving functional mobility tolerance  b. Pt will be able to squat to ground to lift 25# and carry while ambulating using BUEs without balance impairment noted to improve safety with carrying/lifting grandkids  6. Patient to be independent with home exercise program as demonstrated by performance with correct form without cues. 7. Demonstrate Knowledge of fall prevention        LTG: (to be met in 16 treatments)  1. FGA to at least 24/30 to indicate clinically significant improvement in dynamic balance within gait and reduce fall risk  2. 5x STS to no more than 15s to indicate improving overall BLE strength/power for functional transfers  3. Gait speed to at least 1.2 m/s self selected to indicate improving overall balance, safety, and progressing towards improved community speeds on level surfaces  4. Pt reports ability to resume yoga classes, regular walking without concern for balance to encourage regular aerobic exercise completion        Patient goals: \"do yoga, balance better\"    Pt. Education:  [x] Yes  [] No  [x] Reviewed Prior HEP/Ed  Method of Education: [x] Verbal  [x] Demo  [x] Written  Comprehension of Education:  [x] Verbalizes understanding. [x] Demonstrates understanding. [] Needs review. [] Demonstrates/verbalizes HEP/Ed previously given. Plan: [x] Continue current frequency toward long and short term goals.     [x] Specific Instructions for subsequent treatments: progress SLS stability L>R      Time In: 10:03 am            Time Out: 11:00 am    Electronically signed by:  Ezio De Guzman PT

## 2021-08-11 ENCOUNTER — HOSPITAL ENCOUNTER (OUTPATIENT)
Dept: PHYSICAL THERAPY | Facility: CLINIC | Age: 71
Setting detail: THERAPIES SERIES
Discharge: HOME | End: 2021-08-11
Payer: MEDICARE

## 2021-08-11 NOTE — FLOWSHEET NOTE
[] [facility]) [facility] &  Therapy  [address]    [phone]  F: [phone]   [x] [address]  [facility]   [address]  P: [phone]  F: [phone]  [] [address]  [address]  P: [phone]  F: [phone] [] [address]  P: [phone]  F: [phone]  [] [address]  [address] [facility] [address]   [address]   [place]: [phone]  F: [phone]   [] [facility]  [address] [facility] [address]  [place]: [phone]   F: [phone]     Physical Therapy Cancel/No Show note    Date: 2021  Patient: Maurilio Gerardo  : 1950  MRN: [id number]    Cancels/No Shows to date: 1 cx / 0 ns    For today's appointment patient:    [x]  Cancelled    [] Rescheduled appointment    [] No-show     Reason given by patient:    [x]  Patient ill    []  Conflicting appointment    [] No transportation      [] Conflict with work    [] No reason given    [] Weather related    [] [id number]    [] Other:      Comments:        [x] Next appointment was confirmed    Electronically signed by: Karin Carvalho PT

## 2021-08-13 ENCOUNTER — APPOINTMENT (OUTPATIENT)
Dept: PHYSICAL THERAPY | Facility: CLINIC | Age: 71
End: 2021-08-13
Payer: MEDICARE

## 2021-08-18 ENCOUNTER — HOSPITAL ENCOUNTER (OUTPATIENT)
Dept: PHYSICAL THERAPY | Facility: CLINIC | Age: 71
Setting detail: THERAPIES SERIES
Discharge: HOME | End: 2021-08-18
Payer: MEDICARE

## 2021-08-18 PROCEDURE — 97110 THERAPEUTIC EXERCISES: CPT

## 2021-08-18 PROCEDURE — 97112 NEUROMUSCULAR REEDUCATION: CPT

## 2021-08-18 PROCEDURE — 97140 MANUAL THERAPY 1/> REGIONS: CPT

## 2021-08-18 NOTE — FLOWSHEET NOTE
[] [facility]) - [facility] &  Therapy  [address]  [phone]  F: [phone] [x] [address]  [facility]   [address]  P: [phone]  F: [phone] [] [address] &  Therapy  [address] [facility]  P: [phone]  F: [phone] [] [address] [facility]  P: [phone]  F: [phone] [] [address]  [facility]   [address]   [place]: [phone]  F: [phone]      Physical Therapy Daily Treatment Note    Date:  2021  Patient Name:  Maurilio Gerardo    :  1950  MRN: [id number]  Physician: Dr. Nkechi Morgan: [employer] ([address] [place] [address])  Medical Diagnosis: Relapsing remitting multiple sclerosis; Difficulty balancing  Rehab Codes: R53.1, R29.3, R26.9, R27.9  Next 's appt.: 21  Date of onset: 21  Visit# / total visits: ; Progress note for Medicare patient due at visit 10     Cancels/No Shows: 0    Subjective:    Pain:  [] Yes  [x] No Location: N/A Pain Rating: (0-10 scale) 0/10  Pain altered Tx:  [x] No  [] Yes  Action:  Comments: Pt reports she had to cancel last week because she was having intense left lower quadrant pain - was found to just be a hip flexor spasm by her doctor and was given muscle relaxers. Pt believes the hip flexor spasm to be d/t resisted twist exercise performed at end of last therapy session. Pt reports this helped her sleep and now doesn't have much pain.      Objective:  Modalities:   Precautions:  Exercises: Bolded completed 2021:  Exercise Reps/ Time Weight/ Level Comments   Upright bike 6 min L4          Manual 12 min  - proximal hip flexor DI release on L  - DI with concurrent knee to chest on L         Mat      DKTC crunch 15x  easy   DKTC extended out crunch 2x10     Bicycles 2x10 ea  LEs only   SL bridge   SL  = single leg   Sidelying clams  blueberry    Sidelying hip abd 2x15 ea     Sidelying plank            Gym      1st position tandem stance 3x30\" ea  Easier than full tandem for pt  - tandem but with front foot externally rotated   Tandem stance x  Attempted, too much UE assist   SLS 4x20\" ea     SLS taps to cones   3 cones: lateral, anterior, medial    Fwd heel tap to step  12\" Holding 4# ball overhead   Lateral foot tap to step  12\"    Step up w/ knee drive 1F88 ea     CKC hip hike      Tandem amb  // bars Progressed to only unilat UE assist         Gait train   VCs for typical CLIFFORD, upright trunk; walking along line to give external cue for wider stance         Fwd mini lunge 2x10 ea  Focus on genuvalgus correction on R knee; unilat UE assist               Core twists  purple Arms extended out straight, hands together   Wood chops  purple          Other:        Treatment Charges: Mins Units   []  Modalities     [x]  Ther Exercise 10 1   [x]  Manual Therapy 12 1   []  Ther Activities     []  Aquatics     []  Vasocompression     [x]  Other: Neuro 26 1   []  Other: Gait     Total Treatment time 48 3       Assessment: [x] Progressing toward goals. Spent time assessing L hip flexor d/t recent reported strain possibly from exercises last visit - discussed likelihood of weakness causing this, pt verbalized understanding. Does demo palpable tightness/tenderness in proximal L hip flexor, thus performed manual direct inhibition release, followed by active release with knee to chest. No pain or complaints with this or other exercises focused on hip flexor strengthening. Still very difficult for any SLS control tasks, but slowly improving narrow CLIFFORD balance, though full tandem stance is still too difficult to achieve without UE assist.     [] No change.      [] Other:  [x] Patient would continue to benefit from skilled physical therapy services in order to: Progress completion        Patient goals: \"do yoga, balance better\"    Pt. Education:  [x] Yes  [] No  [x] Reviewed Prior HEP/Ed  Method of Education: [x] Verbal  [x] Demo  [x] Written  Comprehension of Education:  [x] Verbalizes understanding. [x] Demonstrates understanding. [] Needs review. [] Demonstrates/verbalizes HEP/Ed previously given. Plan: [x] Continue current frequency toward long and short term goals.     [x] Specific Instructions for subsequent treatments: progress SLS stability bilat, focus      Time In: 5:04 pm            Time Out: 5:58 pm    Electronically signed by:  Pearl Gaona, PT

## 2021-08-20 ENCOUNTER — HOSPITAL ENCOUNTER (OUTPATIENT)
Dept: PHYSICAL THERAPY | Facility: CLINIC | Age: 71
Setting detail: THERAPIES SERIES
Discharge: HOME | End: 2021-08-20
Payer: MEDICARE

## 2021-08-20 PROCEDURE — 97110 THERAPEUTIC EXERCISES: CPT

## 2021-08-20 PROCEDURE — 97112 NEUROMUSCULAR REEDUCATION: CPT

## 2021-08-20 PROCEDURE — 97140 MANUAL THERAPY 1/> REGIONS: CPT

## 2021-08-20 NOTE — FLOWSHEET NOTE
[] [facility]) [facility] &  Therapy  [address]  [phone]  F: [phone] [x] [address]  [facility]   [address]  P: [phone]  F: [phone] [] [address] &  Therapy  [address]  P: [phone]  F: [phone] [] [address]  P: [phone]  F: [phone] [] [address]  [facility]   [address]   [place]: [phone]  F: [phone]      Physical Therapy Daily Treatment Note    Date:  2021  Patient Name:  Jenniffer Salinas    :  1950  MRN: [id number]  Physician: Dr. Duran Bitter: [employer] ([address] [place] [address])  Medical Diagnosis: Relapsing remitting multiple sclerosis; Difficulty balancing  Rehab Codes: R53.1, R29.3, R26.9, R27.9  Next 's appt.: 21  Date of onset: 21  Visit# / total visits: ; Progress note for Medicare patient due at visit 10     Cancels/No Shows: 0    Subjective:    Pain:  [] Yes  [x] No Location: N/A Pain Rating: (0-10 scale) 0/10  Pain altered Tx:  [x] No  [] Yes  Action:  Comments: Pt arrives noting hip flexor pain is doing better bit still there.      Objective:  Modalities:   Precautions:  Exercises: Bolded completed 2021:  Exercise Reps/ Time Weight/ Level Comments   Upright bike 6 min L4          Manual 8 min  - proximal hip flexor DI release on L  - DI with concurrent knee to chest on L         Mat      DKTC crunch 15x  easy   DKTC extended out crunch 2x10     Bicycles 2x10 ea  LEs only   SL bridge 10x ea  SL  = single leg   Sidelying clams 2x15 ea blueberry    Sidelying hip abd 2x15 ea blueberry Band at knees   Sidelying plank            Gym      1st position tandem stance 3x30\" ea  Easier than full tandem for pt  - tandem but with front foot externally rotated   Tandem stance x  Attempted, too much UE assist   SLS 4x20\" ea     SLS taps to cones - reactive 2x30\" ea  4 cones: lateral, anterior, medial, diagonal   Fwd heel tap to step 10x ea alt 12\" Holding 4# ball overhead   Lateral foot tap to step 10x ea 12\" Holding 4# ball overhead   Step up w/ knee drive 4P63 ea 6\" One hand Assist   CKC hip hike      Tandem amb  // bars Progressed to only unilat UE assist   STS with arms crossed 10x  22 inches from floor   STS with weight  10x2 10# 22 inches from floor         Gait train   VCs for typical CLIFFORD, upright trunk; walking along line to give external cue for wider stance         Fwd mini lunge 2x10 ea  Focus on genuvalgus correction on R knee; unilat UE assist               Core twists  purple Arms extended out straight, hands together   Wood chops  purple          Other:        Treatment Charges: Mins Units   []  Modalities     [x]  Ther Exercise 15 1   [x]  Manual Therapy 8 1   []  Ther Activities     []  Aquatics     []  Vasocompression     [x]  Other: Neuro 30 2   []  Other: Gait     Total Treatment time 53 4       Assessment: [x] Progressing toward goals. Again completed manual to release hip flexor due to increased tightness however required less time this session. Able to resume previously held interventions this date. Intermittent rest breaks through out session due to fatigue. Added in sit to stands with and with out weight this date. Attempted to complete from chair however unable to complete with out UE Assist therefore completed from elevated mat table. Pt continues to have balance deficits demonstrated through out session that required UE assist to prevent LOB. [] No change.      [] Other:  [x] Patient would continue to benefit from skilled physical therapy services in order to: Progress BLE hip strength, address gait mechanics, increase balance strategies and single limb stance stability, and further improve functional mobility Verbal  [] Demo  [] Written  Comprehension of Education:  [x] Verbalizes understanding. [x] Demonstrates understanding. [] Needs review. [] Demonstrates/verbalizes HEP/Ed previously given. Plan: [x] Continue current frequency toward long and short term goals.     [x] Specific Instructions for subsequent treatments: progress SLS stability bilat, focus      Time In: 100 pm            Time Out:210 pm    Electronically signed by:  Padmini Mayers, PTA

## 2021-08-23 NOTE — FLOWSHEET NOTE
[] [facility]) - [facility] &  Therapy  [address]    [phone]  F: [phone]   [x] [address]  [facility]   [address]  P: [phone]  F: [phone]  [] [place]  [address]  P: [phone]  F: [phone] [] [address]  P: [phone]  F: [phone]  [] [address]  [facility]   [address]   [place]: [phone]  F: [phone]   [] [place]  [address] [facility] [address]  [place]: [phone]   F: [phone]     Physical Therapy Cancel/No Show note    Date: 2021  Patient: Briana Dumont  : 1950  MRN: [id number]    Cancels/No Shows to date: 2 cx / 0 ns    For today's appointment patient:    [x]  Cancelled    [] Rescheduled appointment    [] No-show     Reason given by patient:    []  Patient ill    []  Conflicting appointment    [] No transportation      [] Conflict with work    [] No reason given    [] Weather related    [] [id number]    [x] Other:      Comments:  Out of town      [x] Next appointment was confirmed    Electronically signed by: Annetta El PTA

## 2021-08-24 ENCOUNTER — HOSPITAL ENCOUNTER (OUTPATIENT)
Dept: PHYSICAL THERAPY | Facility: CLINIC | Age: 71
Setting detail: THERAPIES SERIES
Discharge: HOME | End: 2021-08-24
Payer: MEDICARE

## 2021-08-27 ENCOUNTER — HOSPITAL ENCOUNTER (OUTPATIENT)
Dept: PHYSICAL THERAPY | Facility: CLINIC | Age: 71
Setting detail: THERAPIES SERIES
Discharge: HOME | End: 2021-08-27
Payer: MEDICARE

## 2021-08-27 PROCEDURE — 97140 MANUAL THERAPY 1/> REGIONS: CPT

## 2021-08-27 PROCEDURE — 97112 NEUROMUSCULAR REEDUCATION: CPT

## 2021-08-27 PROCEDURE — 97110 THERAPEUTIC EXERCISES: CPT

## 2021-08-27 NOTE — FLOWSHEET NOTE
[] [facility]) - [facility] &  Therapy  [address]  [phone]  F: [phone] [x] [address]  [facility]   [address]  P: [phone]  F: [phone] [] [address] &  Therapy  [address]  P: [phone]  F: [phone] [] [address]  P: [phone]  F: [phone] [] [address]  [facility]   [address]   [place]: [phone]  F: [phone]      Physical Therapy Daily Treatment Note    Date:  2021  Patient Name:  Arminda Tinsley    :  1950  MRN: [id number]  Physician: Dr. Stevens Canal: [employer] ([address] [place] [address])  Medical Diagnosis: Relapsing remitting multiple sclerosis; Difficulty balancing  Rehab Codes: R53.1, R29.3, R26.9, R27.9  Next 's appt.: 21  Date of onset: 21  Visit# / total visits: ; Progress note for Medicare patient due at visit 10     Cancels/No Shows: 0    Subjective:    Pain:  [] Yes  [x] No Location: N/A Pain Rating: (0-10 scale) 0/10  Pain altered Tx:  [x] No  [] Yes  Action:  Comments: Pt reports hip flexor was more intensely sore after last session and had difficulty sleeping; wonders if she over-did it.       Objective:  Modalities:   Precautions:  Exercises: Bolded completed 2021:  Exercise Reps/ Time Weight/ Level Comments   Upright bike 6 min L4          Manual 8 min  - proximal hip flexor DI release on L  - DI with  concurrent knee to chest on L         Supine hip flexor stretch x  Attempted, not enough stretch   Half kneel hip flexor stretch x  Attempted, not enough stretch   Prone hip flexor stretch 3x30\"           Mat      DKTC crunch 15x     DKTC extended out crunch   Held d/t increased hip flexor pain this date   Bicycles 2x10 ea LEs only   SL bridge 10x ea  SL  = single leg   Sidelying clams 2x15 ea blueberry    Sidelying hip abd 2x15 ea blueberry Band at knees   Sidelying plank            Gym      1st position tandem stance 3x30\" ea  Easier than full tandem for pt  - tandem but with front foot externally rotated   Tandem stance 2x30\" ea  Attempted, too much UE assist   SLS 4x20\" ea     SLS taps to cones - reactive   4 cones: lateral, anterior, medial, diagonal   Fwd heel tap to step  12\" Holding 4# ball overhead   Lateral foot tap to step  12\" Holding 4# ball overhead   Step up w/ knee drive 7B03 ea 6\" One hand Assist   CKC hip hike      Tandem amb  // bars Progressed to only unilat UE assist   STS with arms crossed   22 inches from floor   STS with weight   10# 22 inches from floor         Gait train   VCs for typical CLIFFORD, upright trunk; walking along line to give external cue for wider stance         Fwd mini lunge   Focus on genuvalgus correction on R knee; unilat UE assist               Core twists  purple Arms extended out straight, hands together   Wood chops  purple                Calf incline stretch 3x30\"     Prone hip flexor stretch 3x30\"  Pillows under thighs         Other:        Treatment Charges: Mins Units   []  Modalities     [x]  Ther Exercise 30 2   [x]  Manual Therapy 8 1   []  Ther Activities     []  Aquatics     []  Vasocompression     [x]  Other: Neuro 20 1   []  Other: Gait     Total Treatment time 58 4       Assessment: [x] Progressing toward goals. Continued with hip flexor manual release d/t reports of intermittent pain. Followed by attempts to find best hip flexor stretch for pt d/t continued reports of hip flexor spasm/pain - out of variety of options, only prone lying with legs extended with towel was able to achieve enough proximal stretch for pt to feel it in area of tightness.  Regressed exercises for hip flexor strengthening this date and held others to ensure no future increased spasms d/t overloaded musculature. Noted improvement with static balance in narrow CLIFFORD positions, specifically SLS and tandem stance. Does still require min UE assist for SLS but not for tandem stance, and less trunk/arm sway to regain balance, no need for any stepping strategies this date as well, which is improvement. [] No change. [] Other:  [x] Patient would continue to benefit from skilled physical therapy services in order to: Progress BLE hip strength, address gait mechanics, increase balance strategies and single limb stance stability, and further improve functional mobility including safe/controlled kneel to stand transfers and return to yoga classes. STG: (to be met in 8 treatments)  1. ? Pain: No more than 6/10 max pain in R knee/L ankle with therapy to indicate good tolerance to increased physical activity  2. ? ROM: At least 10deg DF PROM bilat to improve gait/stair mechanics and allow improving lunge mobility for yoga classes/kneel to stand transfers  3. ? Balance: Able to complete tandem amb for 25 ft with only min error (~25%) with keeping toes directly on line to indicate improving anticipatory balance in narrow conditions  4. ? Strength: At least 5-/5 gross bilat hip strength in all planes to allow for improving stability in SLS with gait and improve gait mechanics/efficiency  5. ? Function:   a. Pt will be able to complete household chores without rest break for at least 45 min consecutively to indicate improving functional mobility tolerance  b. Pt will be able to squat to ground to lift 25# and carry while ambulating using BUEs without balance impairment noted to improve safety with carrying/lifting grandkids  6. Patient to be independent with home exercise program as demonstrated by performance with correct form without cues. 7. Demonstrate Knowledge of fall prevention        LTG: (to be met in 16 treatments)  1.  FGA to at least 24/30 to indicate clinically significant improvement in dynamic balance within gait and reduce fall risk  2. 5x STS to no more than 15s to indicate improving overall BLE strength/power for functional transfers  3. Gait speed to at least 1.2 m/s self selected to indicate improving overall balance, safety, and progressing towards improved community speeds on level surfaces  4. Pt reports ability to resume yoga classes, regular walking without concern for balance to encourage regular aerobic exercise completion        Patient goals: \"do yoga, balance better\"    Pt. Education:  [x] Yes  [] No  [x] Reviewed Prior HEP/Ed  Method of Education: [x] Verbal  [] Demo  [] Written  Comprehension of Education:  [x] Verbalizes understanding. [x] Demonstrates understanding. [] Needs review. [] Demonstrates/verbalizes HEP/Ed previously given. Plan: [x] Continue current frequency toward long and short term goals.     [x] Specific Instructions for subsequent treatments: progress SLS stability bilat, focus      Time In: 10:00 am            Time Out: 11:11 am    Electronically signed by:  Cristina Stanley PT

## 2021-09-01 ENCOUNTER — HOSPITAL ENCOUNTER (OUTPATIENT)
Dept: PHYSICAL THERAPY | Facility: CLINIC | Age: 71
Setting detail: THERAPIES SERIES
Discharge: HOME | End: 2021-09-01
Payer: MEDICARE

## 2021-09-03 ENCOUNTER — HOSPITAL ENCOUNTER (OUTPATIENT)
Dept: PHYSICAL THERAPY | Facility: CLINIC | Age: 71
Setting detail: THERAPIES SERIES
Discharge: HOME | End: 2021-09-03
Payer: MEDICARE

## 2021-09-03 PROCEDURE — 97110 THERAPEUTIC EXERCISES: CPT

## 2021-09-03 PROCEDURE — 97112 NEUROMUSCULAR REEDUCATION: CPT

## 2021-09-03 NOTE — FLOWSHEET NOTE
blueberry Band at knees   Sidelying plank            Gym      1st position tandem stance 3x30\" ea  Easier than full tandem for pt  - tandem but with front foot externally rotated   Tandem stance 2x30\" ea  Attempted, too much UE assist   SLS 4x20\" ea     SLS taps to cones - reactive   4 cones: lateral, anterior, medial, diagonal   Fwd heel tap to step  12\" Holding 4# ball overhead   Lateral foot tap to step  12\" Holding 4# ball overhead   Step up w/ knee drive 1D99 ea 6\" One hand Assist   CKC hip hike      Tandem amb 4x40 ft     STS with arms crossed   22 inches from floor   STS with weight   10# 22 inches from floor         Foam      Tandem stance 3x20\" ea     NBOS, ball raises 2x10 4# NBOS - narrow base of support   NBOS, ball twists 2x10 4#    NBOS, ball D1/2 2x10 ea 4#    NBOS, EC   EC  - eyes closed               Gait train   VCs for typical CLIFFORD, upright trunk; walking along line to give external cue for wider stance         Fwd mini lunge 2x12 ea  UE overhead to encourage less UE movement second set               Core twists  purple Arms extended out straight, hands together   Wood chops  purple                Calf incline stretch 3x30\"     Prone hip flexor stretch 3x30\"  Pillows under thighs         Other:         Treatment Charges: Mins Units   []  Modalities     [x]  Ther Exercise 12 1   []  Manual Therapy     []  Ther Activities     []  Aquatics     []  Vasocompression     [x]  Other: Neuro 42 3   []  Other: Gait     Total Treatment time 54 4       Assessment: [x] Progressing toward goals. Pt with no hip flexor pain for the first time in a few sessions - significant improvement. STG assessment completed this date - pt has met all short term goals regarding pain, ankle mobility, dynamic balance, BLE strength/endurance.  Making good progress overall, and demos improving performance of progressive difficult balance even within session - pt has been good with regular HEP completion as well, so will assess for retention in upcoming sessions. [] No change. [] Other:  [x] Patient would continue to benefit from skilled physical therapy services in order to: Progress BLE hip strength, address gait mechanics, increase balance strategies and single limb stance stability, and further improve functional mobility including safe/controlled kneel to stand transfers and return to yoga classes. STG: (to be met in 8 treatments) - Assessed on 9/3/21 by Christin Ramsay, PT:  1. ? Pain: No more than 6/10 max pain in R knee/L ankle with therapy to indicate good tolerance to increased physical activity - MET, 0/10 pain reported  2. ? ROM: At least 10deg DF PROM bilat to improve gait/stair mechanics and allow improving lunge mobility for yoga classes/kneel to stand transfers - MET, 12deg L, 15deg R  3. ? Balance: Able to complete tandem amb for 25 ft with only min error (~25%) with keeping toes directly on line to indicate improving anticipatory balance in narrow conditions - Making progress, 50% stepping error  4. ? Strength: At least 5-/5 gross bilat hip strength in all planes to allow for improving stability in SLS with gait and improve gait mechanics/efficiency - Partially met, 5-/5 for all except 4+/5 hip ext  5. ? Function:    a. Pt will be able to complete household chores without rest break for at least 45 min consecutively to indicate improving functional mobility tolerance - MET  b. Pt will be able to squat to ground to lift 25# and carry while ambulating using BUEs without balance impairment noted to improve safety with carrying/lifting grandkids - MET  6. Patient to be independent with home exercise program as demonstrated by performance with correct form without cues. - MET  7. Demonstrate Knowledge of fall prevention         LTG: (to be met in 16 treatments)  1.  FGA to at least 24/30 to indicate clinically significant improvement in dynamic balance within gait and reduce fall risk   2. 5x STS to no more than 15s to indicate improving overall BLE strength/power for functional transfers  3. Gait speed to at least 1.2 m/s self selected to indicate improving overall balance, safety, and progressing towards improved community speeds on level surfaces  4. Pt reports ability to resume yoga classes, regular walking without concern for balance to encourage regular aerobic exercise completion        Patient goals: \"do yoga, balance better\"    Pt. Education:  [x] Yes  [] No  [x] Reviewed Prior HEP/Ed  Method of Education: [x] Verbal  [] Demo  [x] Written  Comprehension of Education:  [x] Verbalizes understanding. [x] Demonstrates understanding. [] Needs review. [] Demonstrates/verbalizes HEP/Ed previously given. Current HEP: foam head turns/nods, tandem ambulation, tandem stance, SLS, clamshells, SL hip abduction    Plan: [x] Continue current frequency toward long and short term goals.     [x] Specific Instructions for subsequent treatments: progress SLS stability bilat, higher level gait training with obstacles, head turns, etc      Time In: 9:04 am            Time Out: 10:04 am    Electronically signed by:  Lenore Aiken PT

## 2021-09-09 ENCOUNTER — HOSPITAL ENCOUNTER (OUTPATIENT)
Dept: PHYSICAL THERAPY | Facility: CLINIC | Age: 71
Setting detail: THERAPIES SERIES
Discharge: HOME | End: 2021-09-09
Payer: MEDICARE

## 2021-09-09 PROCEDURE — 97116 GAIT TRAINING THERAPY: CPT

## 2021-09-09 PROCEDURE — 97112 NEUROMUSCULAR REEDUCATION: CPT

## 2021-09-09 NOTE — FLOWSHEET NOTE
[] [facility]) - [facility] &  Therapy  [address]  [phone]  F: [phone] [x] [address]  [place] [address]   [address]  P: [phone]  F: [phone] [] [address] &  Therapy  [address]  P: [phone]  F: [phone] [] [address]  P: [phone]  F: [phone] [] [address]  [facility]   [address]   [place]: [phone]  F: [phone]      Physical Therapy Daily Treatment Note    Date:  2021  Patient Name:  Tata Crockett    :  1950  MRN: [id number]  Physician:  [place] Malachi: [employer] ([address] [place] [address])  Medical Diagnosis: Relapsing remitting multiple sclerosis; Difficulty balancing  Rehab Codes: R53.1, R29.3, R26.9, R27.9  Next 's appt.: 21  Date of onset: 21  Visit# / total visits: ; Progress note for Medicare patient due at visit 10     Cancels/No Shows: 0    Subjective:    Pain:  [] Yes  [x] No Location: N/A Pain Rating: (0-10 scale) 0/10  Pain altered Tx:  [x] No  [] Yes  Action:  Comments: Pt arrives noting no new changes. States she will have a busy weekend watching the grandchildren.      Objective:  Modalities:   Precautions:  Exercises: Bolded completed 2021   Exercise Reps/ Time Weight/ Level Comments   Upright bike 6 min L4                Mat      DKTC crunch 15x     DKTC extended out crunch   Held d/t increased hip flexor pain this date   Bicycles 2x10 ea  LEs only   SL bridge 10x ea  SL  = single leg   Sidelying clams 2x15 ea blueberry    Sidelying hip abd 2x15 ea blueberry Band at knees   Sidelying plank            Gym      1st position tandem stance 3x30\" ea  Easier than full tandem for pt  - tandem but with front foot externally rotated Tandem stance 2x30\" ea  Attempted, too much UE assist   SLS 4x20\" ea     SLS taps to cones - reactive   4 cones: lateral, anterior, medial, diagonal   Fwd heel tap to step 3x30\"  12\" Holding 4# ball overhead   Lateral foot tap to step 2x30\" 12\" Holding 4# ball overhead   Step up w/ knee drive 3Y84 ea 6\" One hand Assist   CKC hip hike      Tandem amb 4x40 ft     STS with arms crossed   22 inches from floor   STS with weight   10# 22 inches from floor         Foam      Tandem stance 3x20\" ea     NBOS, ball raises 2x10 4# NBOS - narrow base of support   NBOS, ball twists 2x10 4#    NBOS, ball D1/2 2x10 ea 4#    NBOS, EC 2x30\"   EC  - eyes closed               Gait train 15 min total  VCs for typical CLIFFORD, upright trunk; walking along line to give external cue for wider stance   \"normal\" amb 1 lap     Amb with head turns 1 lap     Amb marching 1 lap ea direction     amb retro 2x30\"      amb side step 1x30\" ea     amb carioca  1x30\" ea     Fwd mini lunge 2x12 ea  UE overhead to encourage less UE movement second set               Core twists  purple Arms extended out straight, hands together   Wood chops  purple                Calf incline stretch 3x30\"     Prone hip flexor stretch 3x30\"  Pillows under thighs         Other:         Treatment Charges: Mins Units   []  Modalities     []  Ther Exercise     []  Manual Therapy     []  Ther Activities     []  Aquatics     []  Vasocompression     [x]  Other: Neuro 35 2   [x]  Other: Gait 15 1   Total Treatment time 50 3       Assessment: [x] Progressing toward goals. Pt with overall good tolerance to treatment with a few rest breaks provided to improve fatigue levels. Time spent with gait training this date to improve ambulation and balance. Pt required cueing to improve upright trunk, forward gaze, increased CLIFFORD during steps. Pt demonstrates scissoring gait when dual tasking is implemented as well as when ambulating retro.  Pt with intermittent LOB that required assistance from clinician to correct. [] No change. [] Other:  [x] Patient would continue to benefit from skilled physical therapy services in order to: Progress BLE hip strength, address gait mechanics, increase balance strategies and single limb stance stability, and further improve functional mobility including safe/controlled kneel to stand transfers and return to yoga classes. STG: (to be met in 8 treatments) - Assessed on 9/3/21 by Sourav Cole PT:  1. ? Pain: No more than 6/10 max pain in R knee/L ankle with therapy to indicate good tolerance to increased physical activity - MET, 0/10 pain reported  2. ? ROM: At least 10deg DF PROM bilat to improve gait/stair mechanics and allow improving lunge mobility for yoga classes/kneel to stand transfers - MET, 12deg L, 15deg R  3. ? Balance: Able to complete tandem amb for 25 ft with only min error (~25%) with keeping toes directly on line to indicate improving anticipatory balance in narrow conditions - Making progress, 50% stepping error  4. ? Strength: At least 5-/5 gross bilat hip strength in all planes to allow for improving stability in SLS with gait and improve gait mechanics/efficiency - Partially met, 5-/5 for all except 4+/5 hip ext  5. ? Function:    a. Pt will be able to complete household chores without rest break for at least 45 min consecutively to indicate improving functional mobility tolerance - MET  b. Pt will be able to squat to ground to lift 25# and carry while ambulating using BUEs without balance impairment noted to improve safety with carrying/lifting grandkids - MET  6. Patient to be independent with home exercise program as demonstrated by performance with correct form without cues. - MET  7. Demonstrate Knowledge of fall prevention         LTG: (to be met in 16 treatments)  1.  FGA to at least 24/30 to indicate clinically significant improvement in dynamic balance within gait and reduce fall risk   2. 5x STS to no more than 15s to indicate improving overall BLE strength/power for functional transfers  3. Gait speed to at least 1.2 m/s self selected to indicate improving overall balance, safety, and progressing towards improved community speeds on level surfaces  4. Pt reports ability to resume yoga classes, regular walking without concern for balance to encourage regular aerobic exercise completion        Patient goals: \"do yoga, balance better\"    Pt. Education:  [x] Yes  [] No  [x] Reviewed Prior HEP/Ed  Method of Education: [x] Verbal  [] Demo  [] Written  Comprehension of Education:  [x] Verbalizes understanding. [x] Demonstrates understanding. [] Needs review. [] Demonstrates/verbalizes HEP/Ed previously given. Current HEP: foam head turns/nods, tandem ambulation, tandem stance, SLS, clamshells, SL hip abduction    Plan: [x] Continue current frequency toward long and short term goals.     [x] Specific Instructions for subsequent treatments: progress SLS stability bilat, higher level gait training with obstacles, head turns, etc      Time In: 10:00 am            Time Out: 11:00  am    Electronically signed by:  Salomón Mosquera PTA

## 2021-09-13 ENCOUNTER — APPOINTMENT (OUTPATIENT)
Dept: PHYSICAL THERAPY | Facility: CLINIC | Age: 71
End: 2021-09-13
Payer: MEDICARE

## 2021-09-15 ENCOUNTER — HOSPITAL ENCOUNTER (OUTPATIENT)
Dept: PHYSICAL THERAPY | Facility: CLINIC | Age: 71
Setting detail: THERAPIES SERIES
Discharge: HOME | End: 2021-09-15
Payer: MEDICARE

## 2021-09-15 PROCEDURE — 97112 NEUROMUSCULAR REEDUCATION: CPT

## 2021-09-15 PROCEDURE — 97116 GAIT TRAINING THERAPY: CPT

## 2021-09-15 NOTE — FLOWSHEET NOTE
[] [facility]) - [facility]  [facility]  [address]  [phone]  F: [phone] [x] [address]  [place] [address]   [address]  P: [phone]  F: [phone] [] [address] &  Therapy  [address]  P: [phone]  F: [phone] [] [address]  P: [phone]  F: [phone] [] [address]  [facility]   [address]   [place]: [phone]  F: [phone]      Physical Therapy Daily Treatment Note    Date:  9/15/2021  Patient Name:  Gianni Sosa    :  1950  MRN: [id number]  Physician: Dr. Roselia Pierce: [employer] ([address] [place] [address])  Medical Diagnosis: Relapsing remitting multiple sclerosis; Difficulty balancing  Rehab Codes: R53.1, R29.3, R26.9, R27.9  Next 's appt.: 21  Date of onset: 21  Visit# / total visits: 10/16; Progress note for Medicare patient due at visit 10     Cancels/No Shows: 0    Subjective:    Pain:  [] Yes  [x] No Location: N/A Pain Rating: (0-10 scale) 0/10  Pain altered Tx:  [x] No  [] Yes  Action:  Comments: Pt states she had a busy weekend watching [employer] but made it! Has company arriving for a few weeks and would like to put off scheduling until after that.      Objective:  Modalities:   Precautions:  Exercises: Bolded completed 9/15/2021   Exercise Reps/ Time Weight/ Level Comments   Upright bike 6 min L4                Mat      DKTC crunch 15x     DKTC extended out crunch   Held d/t increased hip flexor pain this date   Bicycles 2x10 ea  LEs only   SL bridge 10x ea  SL  = single leg   Sidelying clams 2x15 ea blueberry    Sidelying hip abd 2x15 ea blueberry Band at knees   Sidelying plank            Gym      1st position tandem stance 3x30\" ea  Easier than full tandem for pt  - tandem but with front foot externally rotated   Tandem stance 2x30\" ea  Attempted, too much UE assist   SLS 4x20\" ea     SLS taps to cones - reactive   4 cones: lateral, anterior, medial, diagonal   Fwd heel tap to step 3x30\"  12\" Holding 4# ball overhead   Lateral foot tap to step 2x30\" 12\" Holding 4# ball overhead   Step up w/ knee drive 1P86 ea 6\" One hand Assist   CKC hip hike      Tandem amb 4x40 ft     STS with arms crossed 6x5 18\" Timed - ~20-24 s   STS with weight   10# 22 inches from floor         Foam      Tandem stance 3x20\" ea     NBOS, ball raises 2x10 4# NBOS - narrow base of support  - half tandem stance for second set   NBOS, ball twists 2x10 4# Half tandem stance for second set   NBOS, ball D1/2 2x10 ea 4#    NBOS, EC 2x30\"   EC  - eyes closed               Gait train 3 laps  VCs for typical CLIFFORD, upright trunk; walking along line to give external cue for wider stance   Amb with head turns 3 lap     Amb marching 1 lap ea direction     amb retro 4x30\"      amb side step 1x30\" ea     amb carioca  1x30\" ea     Fwd mini lunge 1x12 ea  UE overhead to encourage less UE movement second set   Fwd lunge into warrior pose 10x L  6x R           Core twists  purple Arms extended out straight, hands together   Wood chops  purple                Calf incline stretch 3x30\"     Prone hip flexor stretch 3x30\"  Pillows under thighs         Other:         Treatment Charges: Mins Units   []  Modalities     []  Ther Exercise     []  Manual Therapy     []  Ther Activities     []  Aquatics     []  Vasocompression     [x]  Other: Neuro 37 2   [x]  Other: Gait 15 1   Total Treatment time 52 3       Assessment: [x] Progressing toward goals. Pt with improving stability on foam - able to progress to half tandem stance with ball movements, which was more challenging but pt able to complete. Struggles with head turns with gait in regards to M/L trunk instability and thus moving away from straight path.  When cued to reduce narrow CLIFFORD with gait and avoid cross-over stepping with ambulation, this greatly improves stability. Encouraged pt to continue to work on this at home with HEP. Significant genuvalgus present with fwd lunging that pt is able to intermittently correct with max VCs, though increased genuvalgus on R knee tends to cause sharp pain and thus limited reps on R side. [] No change. [] Other:  [x] Patient would continue to benefit from skilled physical therapy services in order to: Progress BLE hip strength, address gait mechanics, increase balance strategies and single limb stance stability, and further improve functional mobility including safe/controlled kneel to stand transfers and return to yoga classes. STG: (to be met in 8 treatments) - Assessed on 9/3/21 by Janelle Conn PT:  1. ? Pain: No more than 6/10 max pain in R knee/L ankle with therapy to indicate good tolerance to increased physical activity - MET, 0/10 pain reported  2. ? ROM: At least 10deg DF PROM bilat to improve gait/stair mechanics and allow improving lunge mobility for yoga classes/kneel to stand transfers - MET, 12deg L, 15deg R  3. ? Balance: Able to complete tandem amb for 25 ft with only min error (~25%) with keeping toes directly on line to indicate improving anticipatory balance in narrow conditions - Making progress, 50% stepping error  4. ? Strength: At least 5-/5 gross bilat hip strength in all planes to allow for improving stability in SLS with gait and improve gait mechanics/efficiency - Partially met, 5-/5 for all except 4+/5 hip ext  5. ? Function:    a. Pt will be able to complete household chores without rest break for at least 45 min consecutively to indicate improving functional mobility tolerance - MET  b. Pt will be able to squat to ground to lift 25# and carry while ambulating using BUEs without balance impairment noted to improve safety with carrying/lifting grandkids - MET  6.  Patient to be independent with home exercise program as demonstrated by performance with correct form without cues. - MET  7. Demonstrate Knowledge of fall prevention         LTG: (to be met in 16 treatments)  1. FGA to at least 24/30 to indicate clinically significant improvement in dynamic balance within gait and reduce fall risk   2. 5x STS to no more than 15s to indicate improving overall BLE strength/power for functional transfers  3. Gait speed to at least 1.2 m/s self selected to indicate improving overall balance, safety, and progressing towards improved community speeds on level surfaces  4. Pt reports ability to resume yoga classes, regular walking without concern for balance to encourage regular aerobic exercise completion        Patient goals: \"do yoga, balance better\"    Pt. Education:  [x] Yes  [] No  [x] Reviewed Prior HEP/Ed  Method of Education: [x] Verbal  [] Demo  [] Written  Comprehension of Education:  [x] Verbalizes understanding. [x] Demonstrates understanding. [] Needs review. [] Demonstrates/verbalizes HEP/Ed previously given. Current HEP: foam head turns/nods, tandem ambulation, tandem stance, SLS, clamshells, SL hip abduction    Plan: [x] Continue current frequency toward long and short term goals.     [x] Specific Instructions for subsequent treatments: progress SLS stability bilat, higher level gait training with obstacles, head turns, etc      Time In: 11:04 am            Time Out: 12:02  am    Electronically signed by:  Cheryl Marquez, PT

## 2021-09-20 ENCOUNTER — APPOINTMENT (OUTPATIENT)
Dept: PHYSICAL THERAPY | Facility: CLINIC | Age: 71
End: 2021-09-20
Payer: MEDICARE

## 2021-09-22 ENCOUNTER — APPOINTMENT (OUTPATIENT)
Dept: PHYSICAL THERAPY | Facility: CLINIC | Age: 71
End: 2021-09-22
Payer: MEDICARE

## 2021-09-28 ENCOUNTER — OFFICE VISIT (OUTPATIENT)
Dept: NEUROLOGY | Age: 71
End: 2021-09-28
Payer: MEDICARE

## 2021-09-28 VITALS
HEIGHT: 68 IN | WEIGHT: 162 LBS | HEART RATE: 64 BPM | SYSTOLIC BLOOD PRESSURE: 131 MMHG | DIASTOLIC BLOOD PRESSURE: 65 MMHG | BODY MASS INDEX: 24.55 KG/M2

## 2021-09-28 DIAGNOSIS — G35 RELAPSING REMITTING MULTIPLE SCLEROSIS (HCC): Primary | ICD-10-CM

## 2021-09-28 DIAGNOSIS — N31.9 NEUROGENIC BLADDER: ICD-10-CM

## 2021-09-28 DIAGNOSIS — E55.9 VITAMIN D DEFICIENCY: ICD-10-CM

## 2021-09-28 DIAGNOSIS — R53.83 OTHER FATIGUE: ICD-10-CM

## 2021-09-28 DIAGNOSIS — F32.A DEPRESSION, UNSPECIFIED DEPRESSION TYPE: ICD-10-CM

## 2021-09-28 PROCEDURE — 99214 OFFICE O/P EST MOD 30 MIN: CPT | Performed by: PSYCHIATRY & NEUROLOGY

## 2021-09-28 RX ORDER — MELATONIN
1000 DAILY
Qty: 90 TABLET | Refills: 1 | Status: SHIPPED | OUTPATIENT
Start: 2021-09-28

## 2021-09-28 RX ORDER — VENLAFAXINE HYDROCHLORIDE 75 MG/1
CAPSULE, EXTENDED RELEASE ORAL
Qty: 90 CAPSULE | Refills: 1 | Status: SHIPPED | OUTPATIENT
Start: 2021-09-28 | End: 2022-03-29 | Stop reason: ALTCHOICE

## 2021-09-28 NOTE — PROGRESS NOTES
NEUROLOGY FOLLOW UP VISIT   Patient Name:       Mario Bellamy  :        1950  Clinic Visit Date:    2021  LOV: 21      Dear Dr. Dru Jack MD     I saw Ms. Mario Bellamy in follow-up in the office today in continuation of neurologic care. As you know she  is a 73 y.o. right handed  female with relapsing remitting MS since ; not been on any disease modifying therapy for multiple sclerosis since 2020. She was on DMT with Avonex from  until 2020. Since last visit; she has not had any symptoms to indicate MS exacerbation. Admits to having gait difficulties with altered sensation in lower extremities but not progressive. Does not want any further testing done as that she has been stable. She has been taking all the precautions during this COVID-19 pandemic including social distancing and using mask in public places. She comes to the clinic stating that she is suffering from severe fatigue and it is unprovoked and waiting for results of the study from [address] [place] [address]. She still has ongoing gait difficulties and with altered sensation in both lower extremities waist down and it is unchanged. She still on daily doses of vitamin D. She still admits to fatigue and she is still does not want to be on any medications. She also stated that she has finished study at [facility]. Waiting for the test results. She does not want to be on any medications for fatigue stating \"I have tried amantadine and Ritalin\"  in the past and she did not get much benefit. She denies blurred vision, double vision, weakness, vertigo, ataxia, memory disturbance.       Multiple Sclerosis Disease Course  Onset of Symptoms: Date of onset:  (initial right optic neuritis along with lower extremity numbness and gait difficulties)   Date of diagnosis of MS:    Disease course at onset: Relapsing-Remitting  Current disease course: Relapsing-Remitting  Previous disease therapies: Avonex    Current disease therapy: Avonex (1990s -present)  CSF: done many yrs ago; complete details not available; old record notes say \"elevated IgG synthesis\". Vitamin D:  39.8 (12/31/2019)    Smoking status:  never smoked  EDSS: 2.5 with pyramidal dysfx & bladder dysfunction & gait disturbance    Of note; his symptoms of MS started in 1979. She has had initial right optic neuritis along with lower extremity numbness and gait difficulties. She has had abnormal VEP, MRI Brain and abnl CSF and then was diagnosed with relapsing remitting multiple sclerosis in 1979  Her visual evoked potential testing in 1987 was abnormal on right side. Her spinal fluid studies from 1987 revealed increased IgG synthesis. She also had MRI brain in 90s with abnormal findings. Upon reviewing available studies;    MRI brain 12/2010 with plaque consistent with MS but no enhancing disease. MRI brain August 2017 revealed multiple T2 FLAIR hyperintense lesions within supratentorial compartment compatible with the clinical diagnosis of MS but compared to 2014 study; no new lesions or associated contrast enhancement or restricted diffusion seen. MRI cervical spine 1997 with C6 plaque. MRI C-spine 12/2014 with nonenhancing MS plaques in inferior medulla, cervical cord at level of C2 and C4 vertebral bodies, at level of C2-C3, C5-C6 and C6-C7 disks as well as small enhancing MS plaque in posterior aspect of the cord at level of T1-T2 disc; also mild broad-based disc protrusions at C5-C6 and C6-C7 about the anterior contour of the cord and lead to mild spinal canal stenosis. MRI cervical spine August 2017 revealed T2 hyperintense lesions which appear similar to prior exam with no new lesions or associated enhancement but degenerative changes contributing to minimal spinal canal stenosis at C6-C7. All items selected indicate a positive finding.     Those items not selected are negative. Constitutional [] Weight loss/gain   [x] Fatigue  [] Fever/Chills   HEENT [] Hearing Loss  [] Visual Disturbance  [] Tinnitus  [] Eye pain   Respiratory [] Shortness of Breath  [] Cough  [] Snoring   Cardiovascular [] Chest Pain  [] Palpitations  [] Lightheaded   GI [] Constipation  [] Diarrhea  [] Swallowing change    [] Urinary Frequency  [] Urinary Urgency   Musculoskeletal [] Neck pain  [] Back pain  [] Muscle pain  [] Restless legs   Dermatologic [] Skin changes   Neurologic [] Memory loss/confusion  [] Seizures  [x] Trouble walking or imbalance  [] Dizziness  [x] Weakness  [x] Numbness  [] Tremors  [] Speech Difficulty  [] Headaches  [] Light Sensitivity  [] Sound Sensitivity   Endocrinology []Excessive thirst  []Excessive hunger   Psychiatric [] Anxiety/Depression  [] Hallucination   Allergy/immunology []Hives/environmental allergies   Hematologic/lymph [] Abnormal bleeding  [] Abnormal bruising         Current Outpatient Medications on File Prior to Visit   Medication Sig Dispense Refill    cyclobenzaprine (FLEXERIL) 5 MG tablet Take 5 mg by mouth nightly as needed      oxybutynin (DITROPAN-XL) 10 MG extended release tablet Take 1 tablet by mouth daily 90 tablet 3    Vitamins A & D (VITAMIN A & D) 5000-400 units CAPS Take by mouth      amLODIPine (NORVASC) 5 MG tablet       venlafaxine (EFFEXOR XR) 75 MG extended release capsule Take one cap daily 30 capsule 1    VITAMIN D PO Take 1,000 Units by mouth 2 times daily      magnesium 200 MG TABS tablet Take 200 mg by mouth daily      COD LIVER OIL PO Take by mouth daily      levothyroxine (SYNTHROID) 150 MCG tablet Take 1 tablet by mouth daily.  Cranberry 250 MG TABS Take  by mouth 2 times daily. Takes Cranactin from [facility] [place] ordered by urology       No current facility-administered medications on file prior to visit. Allergies: Daya Estrada is allergic to no known allergies.     Past Medical History:   Diagnosis Date    Caffeine use     1 coffee/day    Constipation     Depression, major, recurrent (HCC)     Treated with EFFEXOR.  Diverticulitis     Hypertension     Hypothyroid 1996    Thyroid cancer surgically removed. Treated with LEVOXYL.  Irregular menses     resolved    Migraine with aura     Remote problem    Mononucleosis 1971    Caused Erythema Nodosum    Multiple sclerosis ([address]     Osteoporosis     Treated with FOSAMAX.  Relapsing remitting multiple sclerosis (HCC)     Thyroid [facility]     Urinary incontinence     Urinary retention     Vaginal atrophy        Past Surgical History:   Procedure Laterality Date    COLONOSCOPY      DILATION AND CURETTAGE  2/2005    with h/s    THYROIDECTOMY      Removed in 2 surgeries due to cancer.  TONSILLECTOMY       Social History: Ashley Latham  reports that she has never smoked. She has never used smokeless tobacco. She reports current alcohol use. She reports that she does not use drugs. Family History   Problem Relation Age of Onset    Other Father         stroke, Diabetes    Other Mother         Thyroid disease    Other Maternal Grandmother         Colon Ca    Other Maternal Grandfather         Colon Ca       On exam: Blood pressure 131/65, pulse 64, height 5' 8\" (1.727 m), weight 162 lb (73.5 kg), not currently breastfeeding.       GENERAL  Appears comfortable and in no distress   HEENT  NC/ AT   NECK  Supple and no bruits heard   MENTAL STATUS:  Alert, oriented, intact memory, no confusion, normal speech, normal language, no hallucination or delusion; appropriate affect   CRANIAL NERVES: II     -      PERRLA, fundi reveal intact venous pulsations, visual fields intact to confrontation  III,IV,VI -  EOMs full, no afferent defect, no                      MAREK, no ptosis  V     -     Normal facial sensation  VII    -     Normal facial symmetry  VIII   -     Intact hearing  IX,X -     Symmetrical palate  XI    -     Symmetrical shoulder shrug  XII   - Midline tongue, no atrophy    MOTOR FUNCTION:  significant for subtle weakness of grade 5-/5 in distal muscle groups of left lower extremity otherwise good strength of grade 5/5 in bilateral proximal and distal muscle groups of both upper and lower extremities with normal bulk, normal tone and no involuntary movements, no tremor   SENSORY FUNCTION:  decreased LT, PP, temp and vibration in both lower extremities   CEREBELLAR FUNCTION:  Intact fine motor control over upper limbs   REFLEX FUNCTION:  Symmetric, no perverted reflex, no Babinski sign   STATION and GAIT  Normal station, Antalgic gait; could not do tandem gait      Diagnostic data reviewed with the patient:   MRI Brain (w/wo) (8/9/17): Multiple T2 FLAIR hyperintense lesions within the supratentorial compartment compatible with the clinical diagnosis of multiple sclerosis. No new lesions are clearly identified. No associated contrast enhancement or restricted diffusion noted. MRI Cervical Spine (8/9/17): T2 hyperintense lesions are seen in cervical cord; at C2, C5-C6, T2 level; which appear similar to the prior exam on 12/18/14. No new lesions clearly identified. No associated enhancement. Degenerative changes of cervical spine contributing to minimal spinal canal stenosis at C6-C7. No spinal canal stenosis at remaining levels. Degenerative changes contributing to neuroforaminal narrowing noted. Visual evoked potential testing (1987): Abnormal on right side. CSF study (1987) abnormal with increased IgG synthesis.     VITAMIN D 25 hydroxy level (4/26/18): 18.6     MRI brain (with/without) 2/11/2020: Study is compared with 8/9/2017 study; demyelinating lesions are present consistent with history of MS.  No evidence for active or acute demyelination.         MRI cervical spine (with/without) 2/11/2020: Study is compared with 8/9/2017 study; demyelinating lesions in the cervical spinal cord is consistent with multiple sclerosis.  But no evidence for active or acute demyelination                Impression and Plan: Ms. Ashley Latham is a 73 y.o. female with   · Relapsing remitting multiple sclerosis; not on any DMT as per her wishes  · Unprovoked deep fatigue  · Vitamin D deficiency  · Neurogenic bladder  · Major depression  · Hypertension: Stable      Relapsing remitting multiple sclerosis on DMT with Avonex since 1979 until Feb 2020; stopped Avonex since Feb 2020; stopped it on her own; does not want to be on any new DMT agents. Repeat brain MRI and cervical spine MRI on 2/11/2020 showed demyelinating lesions but no evidence for active or acute demyelination. Asymptomatic hypertension; most likely related to venlafaxine 75 mg qd. . ,elevated blood pressure reading: Upon reviewing her blood pressure readings at home; she stated that her blood pressure has been running 120-130/70-80. She denies any symptoms such as headache, cp, etc.    Completed the fatigue study through [facility]; study completed and she is waiting for the results. Vitamin D deficiency: continue cholecalciferol 1000 units once daily. Symptomatic therapy:   Depression, major, recurrent: stable on Effexor 75 mg qd. Neurogenic bladder: stable; wants to continue oxybutynin 10 mg qd; under care of Dr. Savanna Aguilar. Fatigue: tried Amantadine and Ritalin without any relief; has completed the case study at [facility]; she did not get any reports yet; results awaiting; does not want to be on any medication for it. Follow up in 2 months. This note was partially created using voice recognition software and is inherently subject to errors including those of syntax and \"sound alike\" substitutions which may escape proofreading. In such instances, original meaning may be extrapolated by contextual derivation.

## 2021-10-06 ENCOUNTER — HOSPITAL ENCOUNTER (OUTPATIENT)
Dept: PHYSICAL THERAPY | Facility: CLINIC | Age: 71
Setting detail: THERAPIES SERIES
Discharge: HOME | End: 2021-10-06
Payer: MEDICARE

## 2021-10-06 PROCEDURE — 97112 NEUROMUSCULAR REEDUCATION: CPT

## 2021-10-06 PROCEDURE — 97116 GAIT TRAINING THERAPY: CPT

## 2021-10-06 NOTE — FLOWSHEET NOTE
[] [facility]  [address]  [phone]  F: [phone] [x] [address]  [facility]   [address]  P: [phone]  F: [phone] [] [address] [facility]  [facility]  [address] [facility]  P: [phone]  F: [phone] [] [address]  P: [phone]  F: [phone] [] [address]  [facility]   [address]   [place]: [phone]  F: [phone]      Physical Therapy Daily Treatment Note    Date:  10/6/2021  Patient Name:  Shannon Sena    :  1950  MRN: [id number]  Physician: Dr. Jemima Marroquin: [address] [place] [address]  Medicare ([address] [place] [address])  Medical Diagnosis: Relapsing remitting multiple sclerosis; Difficulty balancing  Rehab Codes: R53.1, R29.3, R26.9, R27.9  Next 's appt.: 21  Date of onset: 21  Visit# / total visits: ; Progress note for Medicare patient due at visit 10     Cancels/No Shows: 0    Subjective:    Pain:  [] Yes  [x] No Location: N/A Pain Rating: (0-10 scale) 0/10  Pain altered Tx:  [x] No  [] Yes  Action:  Comments: Pt states she had a great time over the 2 week break from therapy d/t friend from out of town staying with her. Notes she regularly kept up on exercises.      Objective:  Modalities:   Precautions:  Exercises: Bolded completed 10/6/2021   Exercise Reps/ Time Weight/ Level Comments   Upright bike 6 min L4                Mat      DKTC crunch 15x     DKTC extended out crunch   Held d/t increased hip flexor pain this date   Bicycles 2x10 ea  LEs only   SL bridge 10x ea  SL  = single leg   Sidelying clams 2x15 ea blueberry    Sidelying hip abd 2x15 ea blueberry Band at knees   Sidelying plank            Gym      1st position tandem stance 3x30\" ea  Easier than full Tends to externally rotate RLE to maintain stability in tandem stance and with ramón negotiation; able to improve with VCs to increase attention to this. Does still demo trunk instability with gait challenges and hypermetric UE arm swing - will continue to benefit from higher-level gait challenges and dynamic balance. [] No change. [] Other:  [x] Patient would continue to benefit from skilled physical therapy services in order to: Progress BLE hip strength, address gait mechanics, increase balance strategies and single limb stance stability, and further improve functional mobility including safe/controlled kneel to stand transfers and return to yoga classes. STG: (to be met in 8 treatments) - Assessed on 9/3/21 by Kirti Reynoso, PT:  1. ? Pain: No more than 6/10 max pain in R knee/L ankle with therapy to indicate good tolerance to increased physical activity - MET, 0/10 pain reported  2. ? ROM: At least 10deg DF PROM bilat to improve gait/stair mechanics and allow improving lunge mobility for yoga classes/kneel to stand transfers - MET, 12deg L, 15deg R  3. ? Balance: Able to complete tandem amb for 25 ft with only min error (~25%) with keeping toes directly on line to indicate improving anticipatory balance in narrow conditions - Making progress, 50% stepping error  4. ? Strength: At least 5-/5 gross bilat hip strength in all planes to allow for improving stability in SLS with gait and improve gait mechanics/efficiency - Partially met, 5-/5 for all except 4+/5 hip ext  5. ? Function:    a. Pt will be able to complete household chores without rest break for at least 45 min consecutively to indicate improving functional mobility tolerance - MET  b. Pt will be able to squat to ground to lift 25# and carry while ambulating using BUEs without balance impairment noted to improve safety with carrying/lifting grandkids - MET  6.  Patient to be independent with home exercise program as demonstrated by performance with correct form without cues. - MET  7. Demonstrate Knowledge of fall prevention         LTG: (to be met in 16 treatments)  1. FGA to at least 24/30 to indicate clinically significant improvement in dynamic balance within gait and reduce fall risk   2. 5x STS to no more than 15s to indicate improving overall BLE strength/power for functional transfers  3. Gait speed to at least 1.2 m/s self selected to indicate improving overall balance, safety, and progressing towards improved community speeds on level surfaces  4. Pt reports ability to resume yoga classes, regular walking without concern for balance to encourage regular aerobic exercise completion        Patient goals: \"do yoga, balance better\"    Pt. Education:  [x] Yes  [] No  [x] Reviewed Prior HEP/Ed  Method of Education: [x] Verbal  [] Demo  [] Written  Comprehension of Education:  [x] Verbalizes understanding. [x] Demonstrates understanding. [] Needs review. [] Demonstrates/verbalizes HEP/Ed previously given. Current HEP: foam head turns/nods, tandem ambulation, tandem stance, SLS, clamshells, SL hip abduction    Plan: [x] Continue current frequency toward long and short term goals.     [x] Specific Instructions for subsequent treatments: progress SLS stability bilat, higher level gait training with obstacles, head turns, etc      Time In: 10:02 am            Time Out: 11:00 am    Electronically signed by:  Flavia Jason PT

## 2021-10-08 ENCOUNTER — HOSPITAL ENCOUNTER (OUTPATIENT)
Dept: PHYSICAL THERAPY | Facility: CLINIC | Age: 71
Setting detail: THERAPIES SERIES
Discharge: HOME | End: 2021-10-08
Payer: MEDICARE

## 2021-10-08 PROCEDURE — 97112 NEUROMUSCULAR REEDUCATION: CPT

## 2021-10-08 PROCEDURE — 97116 GAIT TRAINING THERAPY: CPT

## 2021-10-08 NOTE — FLOWSHEET NOTE
[] [facility]) - [facility]  [facility]  [address]  [phone]  F: [phone] [x] [address]  [facility]   [address]  P: [phone]  F: [phone] [] [address] &  Therapy  [address]  P: [phone]  F: [phone] [] [address]  P: [phone]  F: [phone] [] [address]  [facility]   [address]   [place]: [phone]  F: [phone]      Physical Therapy Daily Treatment Note    Date:  10/8/2021  Patient Name:  Celestina Shah    :  1950  MRN: [id number]  Physician: Dr. Jona Reis: Heladio bell Medicare ([address] [place] [address])  Medical Diagnosis: Relapsing remitting multiple sclerosis; Difficulty balancing  Rehab Codes: R53.1, R29.3, R26.9, R27.9  Next 's appt.: 21  Date of onset: 21  Visit# / total visits: ; Progress note for Medicare patient due at visit 10     Cancels/No Shows: 0    Subjective:    Pain:  [] Yes  [x] No Location: N/A Pain Rating: (0-10 scale) 0/10  Pain altered Tx:  [x] No  [] Yes  Action:  Comments: Pt states she had a headache/tired and not feeling good after booster vaccine yesterday, but feels back to normal today.     Objective:  Modalities:   Precautions:  Exercises: Bolded completed 10/8/2021   Exercise Reps/ Time Weight/ Level Comments   Upright bike 6 min L4                Mat      DKTC crunch 15x     DKTC extended out crunch   Held d/t increased hip flexor pain this date   Bicycles 2x10 ea  LEs only   SL bridge 10x ea  SL  = single leg   Sidelying clams 2x15 ea blueberry    Sidelying hip abd 2x15 ea blueberry Band at knees   Sidelying plank            Gym      1st position tandem stance 3x30\" ea  Easier than full tandem for pt  - tandem but with front foot externally rotated   Tandem stance 2x30\" ea  Attempted, too much UE assist   SLS 4x30\" ea  Min unilat UE assist   SLS taps to cones - reactive   4 cones: lateral, anterior, medial, diagonal   Fwd heel tap to step 3x30\"  12\" Holding 4# ball overhead   Lateral foot tap to step 2x30\" 12\" Holding 4# ball overhead   Step up w/ knee drive [id number] ea 6\" One hand Assist   CKC hip hike      Tandem amb 4x40 ft     STS with arms crossed 3x5 18\" Timed:18.57s   STS with weight   10# 22 inches from floor         Foam      Tandem stance 3x20\" ea     Half tandem, ball raises 2x10 4# NBOS - narrow base of support  - half tandem stance for second set   Half tandem, ball twists 2x10 4# Half tandem stance for second set   NBOS, ball D1/2 2x10 ea 4#    NBOS, EC  4x30\"   EC  - eyes closed               Gait train w/ step up 6 laps 1 8\" curb step    Amb with head turns 3 lap     Amb marching 1 lap ea direction     amb retro 8x30\"'  Increasing speed as able   amb eyes closed 8x30'  Min VCs to maintain stright path   amb side step 2x30\" ea     amb carioca  2x30\" ea  Last set with holding red therapy ball   Obstacle ambulation 6 min total  Foam, hurdles, 8\" step up - random practice   Fwd mini lunge 1x12 ea  UE overhead to encourage less UE movement second set   Fwd lunge into warrior pose 12x ea  Rotating R         Core twists  purple Arms extended out straight, hands together   Wood chops  purple                Calf incline stretch 3x30\"     Prone hip flexor stretch 3x30\"  Pillows under thighs         Other:         Treatment Charges: Mins Units   []  Modalities     []  Ther Exercise     []  Manual Therapy     []  Ther Activities     []  Aquatics     []  Vasocompression     [x]  Other: Neuro 30 2   [x]  Other: Gait 24 2   Total Treatment time 54 4       Assessment: [x] Progressing toward goals. Continued to progress balance challenges this session d/t pt improving in stability on complaint surface.  Does benefit from completion in parallel bars d/t 2-3x instance of LOB that pt needs UE assist to prevent. Progressed gait challenges to include curb step up within gait - able to improve speed and anticipatory balance with repetition within session. Added eyes closed gait as well, with pt tending to veer right in gait path intermittently ~3-5 ft. Better ability at baseline to reduce scissoring/adducted stepping, with pt noting she initially has to remind her self of these mechanics, but less concentration needed throughout. Does continue with hypermetric arm swing through most tasks, this appears to potentially be pt's baseline but attempted to reduce this compensation by having pt hold onto ball throughout various tasks. [] No change. [] Other:  [x] Patient would continue to benefit from skilled physical therapy services in order to: Progress BLE hip strength, address gait mechanics, increase balance strategies and single limb stance stability, and further improve functional mobility including safe/controlled kneel to stand transfers and return to yoga classes. STG: (to be met in 8 treatments) - Assessed on 9/3/21 by Lisa Guzman, PT:  1. ? Pain: No more than 6/10 max pain in R knee/L ankle with therapy to indicate good tolerance to increased physical activity - MET, 0/10 pain reported  2. ? ROM: At least 10deg DF PROM bilat to improve gait/stair mechanics and allow improving lunge mobility for yoga classes/kneel to stand transfers - MET, 12deg L, 15deg R  3. ? Balance: Able to complete tandem amb for 25 ft with only min error (~25%) with keeping toes directly on line to indicate improving anticipatory balance in narrow conditions - Making progress, 50% stepping error  4. ? Strength: At least 5-/5 gross bilat hip strength in all planes to allow for improving stability in SLS with gait and improve gait mechanics/efficiency - Partially met, 5-/5 for all except 4+/5 hip ext  5. ? Function:    a.  Pt will be able to complete household chores without rest break for at least 45 min consecutively to indicate improving functional mobility tolerance - MET  b. Pt will be able to squat to ground to lift 25# and carry while ambulating using BUEs without balance impairment noted to improve safety with carrying/lifting grandkids - MET  6. Patient to be independent with home exercise program as demonstrated by performance with correct form without cues. - MET  7. Demonstrate Knowledge of fall prevention - MET         LTG: (to be met in 16 treatments)  1. FGA to at least 24/30 to indicate clinically significant improvement in dynamic balance within gait and reduce fall risk   2. 5x STS to no more than 15s to indicate improving overall BLE strength/power for functional transfers  3. Gait speed to at least 1.2 m/s self selected to indicate improving overall balance, safety, and progressing towards improved community speeds on level surfaces  4. Pt reports ability to resume yoga classes, regular walking without concern for balance to encourage regular aerobic exercise completion        Patient goals: \"do yoga, balance better\"    Pt. Education:  [x] Yes  [] No  [x] Reviewed Prior HEP/Ed  Method of Education: [x] Verbal  [] Demo  [] Written  Comprehension of Education:  [x] Verbalizes understanding. [x] Demonstrates understanding. [] Needs review. [] Demonstrates/verbalizes HEP/Ed previously given. Current HEP: foam head turns/nods, tandem ambulation, tandem stance, SLS, clamshells, SL hip abduction    Plan: [x] Continue current frequency toward long and short term goals.     [x] Specific Instructions for subsequent treatments: progress SLS stability bilat, higher level gait training with obstacles, head turns, etc      Time In: 10:00 am            Time Out: 11:00 am    Electronically signed by:  Tosin Cooper, PT

## 2021-10-13 ENCOUNTER — HOSPITAL ENCOUNTER (OUTPATIENT)
Dept: PHYSICAL THERAPY | Facility: CLINIC | Age: 71
Setting detail: THERAPIES SERIES
Discharge: HOME | End: 2021-10-13
Payer: MEDICARE

## 2021-10-13 PROCEDURE — 97112 NEUROMUSCULAR REEDUCATION: CPT

## 2021-10-13 PROCEDURE — 97116 GAIT TRAINING THERAPY: CPT

## 2021-10-13 NOTE — FLOWSHEET NOTE
[] [facility]) [facility]  [facility]  [address]  [phone]  F: [phone] [x] [address]  [place] [address]   [address]  P: [phone]  F: [phone] [] [address] &  Therapy  [address] [facility]  P: [phone]  F: [phone] [] [address]  P: [phone]  F: [phone] [] [address]  [facility]   [address]   [place]: [phone]  F: [phone]      Physical Therapy Daily Treatment Note    Date:  10/13/2021  Patient Name:  Ashley Latham    :  1950  MRN: [id number]  Physician: Dr. Coelho Medal: Donnell Navarro Medicare ([address] [place] [address])  Medical Diagnosis: Relapsing remitting multiple sclerosis; Difficulty balancing  Rehab Codes: R53.1, R29.3, R26.9, R27.9  Next 's appt.: 21  Date of onset: 21  Visit# / total visits: ; Progress note for Medicare patient due at visit 10     Cancels/No Shows: 0    Subjective:    Pain:  [] Yes  [x] No Location: N/A Pain Rating: (0-10 scale) 0/10  Pain altered Tx:  [x] No  [] Yes  Action:  Comments: Pt states her balance has been doing well.      Objective:  Modalities:   Precautions:  Exercises: Bolded completed 10/13/2021   Exercise Reps/ Time Weight/ Level Comments   Upright bike 6 min L4                Mat      DKTC crunch 15x     DKTC extended out crunch   Held d/t increased hip flexor pain this date   Bicycles 2x10 ea  LEs only   SL bridge 10x ea  SL  = single leg   Sidelying clams 2x15 ea blueberry    Sidelying hip abd 2x15 ea blueberry Band at knees   Sidelying plank            Gym      1st position tandem stance 3x30\" ea  Easier than full tandem for pt  - tandem but with front foot externally rotated   Tandem stance 2x30\" ea  Attempted, too much UE assist   SLS 3x30\" ea  Min unilat UE assist   SLS taps to cones - reactive   4 cones: lateral, anterior, medial, diagonal   Fwd heel tap to step 3x30\"  12\" Holding 4# ball overhead   Lateral foot tap to step 2x30\" 12\" Holding 4# ball overhead   Step up w/ knee drive [id number] ea 6\" One hand Assist   CKC hip hike      Tandem amb 4x40 ft     STS with arms crossed 3x5 18\" Timed:18.57s   STS with weight   10# 22 inches from floor         Foam      Tandem stance 3x20\" ea     Half tandem, ball raises 2x10 4# NBOS - narrow base of support  - half tandem stance for second set   Half tandem, ball twists 2x10 4# Half tandem stance for second set   NBOS, ball D1/2 2x10 ea 4#    NBOS, EC  4x30\"   EC  - eyes closed               Gait train w/ step up 6 laps 1 8\" curb step    Amb with head turns 3 lap     Amb marching 1 lap ea direction     amb retro 8x30\"'  Increasing speed as able   amb eyes closed 8x30'  Min VCs to maintain stright path   amb side step 2x30\" ea     amb carioca  2x30\" ea  Last set with holding red therapy ball   Obstacle ambulation 6 min total  Foam, hurdles, 8\" step up - random practice   Fwd mini lunge 1x12 ea  UE overhead to encourage less UE movement second set   Fwd lunge into warrior pose 12x ea  Rotating R         Core twists  purple Arms extended out straight, hands together   Wood chops  purple                Calf incline stretch 3x30\"     Prone hip flexor stretch 3x30\"  Pillows under thighs         Other:         Treatment Charges: Mins Units   []  Modalities     []  Ther Exercise     []  Manual Therapy     []  Ther Activities     []  Aquatics     []  Vasocompression     [x]  Other: Neuro 42 3   [x]  Other: Gait 12 1   Total Treatment time 54 4       Assessment: [x] Progressing toward goals. Continues to demo improving balance in narrow CLIFFORD positions on compliant surfaces, even with anticipatory balance challenges. SBA only vs CGA, needs only 2x intermittent UE assist to maintain balance.  Able to reduce UE assist for single limb stance balance challenges as well, with only intermittent UE assist needed for SLS holds and one finger for dynamic step ups, min verbal cues to increase core activation. Improving eyes closed amb as evidenced by less veering off straight path with repetition within session. Still struggles with tandem stance without UE assist; regressed to // bars with min UE assist with emphasis on neutral foot placement and direct heel/toe placement. [] No change. [] Other:  [x] Patient would continue to benefit from skilled physical therapy services in order to: Progress BLE hip strength, address gait mechanics, increase balance strategies and single limb stance stability, and further improve functional mobility including safe/controlled kneel to stand transfers and return to yoga classes. STG: (to be met in 8 treatments) - Assessed on 9/3/21 by Lorena Chand, PT:  1. ? Pain: No more than 6/10 max pain in R knee/L ankle with therapy to indicate good tolerance to increased physical activity - MET, 0/10 pain reported  2. ? ROM: At least 10deg DF PROM bilat to improve gait/stair mechanics and allow improving lunge mobility for yoga classes/kneel to stand transfers - MET, 12deg L, 15deg R  3. ? Balance: Able to complete tandem amb for 25 ft with only min error (~25%) with keeping toes directly on line to indicate improving anticipatory balance in narrow conditions - Making progress, 50% stepping error  4. ? Strength: At least 5-/5 gross bilat hip strength in all planes to allow for improving stability in SLS with gait and improve gait mechanics/efficiency - Partially met, 5-/5 for all except 4+/5 hip ext  5. ? Function:    a. Pt will be able to complete household chores without rest break for at least 45 min consecutively to indicate improving functional mobility tolerance - MET  b.  Pt will be able to squat to ground to lift 25# and carry while ambulating using BUEs without balance impairment noted to improve safety with carrying/lifting grandkids - MET  6. Patient to be independent with home exercise program as demonstrated by performance with correct form without cues. - MET  7. Demonstrate Knowledge of fall prevention - MET         LTG: (to be met in 16 treatments)  1. FGA to at least 24/30 to indicate clinically significant improvement in dynamic balance within gait and reduce fall risk   2. 5x STS to no more than 15s to indicate improving overall BLE strength/power for functional transfers  3. Gait speed to at least 1.2 m/s self selected to indicate improving overall balance, safety, and progressing towards improved community speeds on level surfaces  4. Pt reports ability to resume yoga classes, regular walking without concern for balance to encourage regular aerobic exercise completion        Patient goals: \"do yoga, balance better\"    Pt. Education:  [x] Yes  [] No  [x] Reviewed Prior HEP/Ed  Method of Education: [x] Verbal  [] Demo  [] Written  Comprehension of Education:  [x] Verbalizes understanding. [x] Demonstrates understanding. [] Needs review. [] Demonstrates/verbalizes HEP/Ed previously given. Current HEP: foam head turns/nods, tandem ambulation, tandem stance, SLS, clamshells, SL hip abduction    Plan: [x] Continue current frequency toward long and short term goals.     [x] Specific Instructions for subsequent treatments: progress SLS stability bilat, higher level gait training with obstacles, head turns, etc      Time In: 10:00 am            Time Out: 11:00 am    Electronically signed by:  Bib Goodson PT

## 2021-10-15 ENCOUNTER — HOSPITAL ENCOUNTER (OUTPATIENT)
Dept: PHYSICAL THERAPY | Facility: CLINIC | Age: 71
Setting detail: THERAPIES SERIES
Discharge: HOME | End: 2021-10-15
Payer: MEDICARE

## 2021-10-15 PROCEDURE — 97116 GAIT TRAINING THERAPY: CPT

## 2021-10-15 PROCEDURE — 97112 NEUROMUSCULAR REEDUCATION: CPT

## 2021-10-15 NOTE — FLOWSHEET NOTE
[] [facility]) - [facility]  [facility]rapy  [address]  [phone]  F: [phone] [x] [address]  [place] [address]   [address]  P: [phone]  F: [phone] [] [address] &  Therapy  [address]  P: [phone]  F: [phone] [] [address]  P: [phone]  F: [phone] [] [address]  [facility]   [address]   [place]: [phone]  F: [phone]      Physical Therapy Daily Treatment Note    Date:  10/15/2021  Patient Name:  Tabitha Nichols    :  1950  MRN: [id number]  Physician: Dr. Marino Cadet: Adia Vaz Medicare ([address] [place] [address])  Medical Diagnosis: Relapsing remitting multiple sclerosis; Difficulty balancing  Rehab Codes: R53.1, R29.3, R26.9, R27.9  Next 's appt.: 21  Date of onset: 21  Visit# / total visits: ; Progress note for Medicare patient due at visit 10     Cancels/No Shows: 0    Subjective:    Pain:  [] Yes  [x] No Location: N/A Pain Rating: (0-10 scale) 0/10  Pain altered Tx:  [x] No  [] Yes  Action:  Comments: Pt states her R knee has been bothering her when completing the lunge into warrior position. Even without arm movement in this exercise, will note knee pain.      Objective:  Modalities:   Precautions:  Exercises: Bolded completed 10/15/2021   Exercise Reps/ Time Weight/ Level Comments   Upright bike 5 min L4                Mat      DKTC crunch 15x     DKTC extended out crunch   Held d/t increased hip flexor pain this date   Bicycles 2x10 ea  LEs only   SL bridge 10x ea  SL  = single leg   Sidelying clams 2x15 ea blueberry    Sidelying hip abd 2x15 ea blueberry Band at knees   Sidelying plank            Gym      1st position tandem stance 3x30\" ea  Easier than to increase anticipatory balance reaction time while in SLS. Overall better implicit balance reactions this date including with tandem stance ambulation - significantly less stepping strategy needed to recover balance, and min VCs to increase core activation also helped improve stability. [] No change. [] Other:  [x] Patient would continue to benefit from skilled physical therapy services in order to: Progress BLE hip strength, address gait mechanics, increase balance strategies and single limb stance stability, and further improve functional mobility including safe/controlled kneel to stand transfers and return to yoga classes. STG: (to be met in 8 treatments) - Assessed on 9/3/21 by Ida Beltran, PT:  1. ? Pain: No more than 6/10 max pain in R knee/L ankle with therapy to indicate good tolerance to increased physical activity - MET, 0/10 pain reported  2. ? ROM: At least 10deg DF PROM bilat to improve gait/stair mechanics and allow improving lunge mobility for yoga classes/kneel to stand transfers - MET, 12deg L, 15deg R  3. ? Balance: Able to complete tandem amb for 25 ft with only min error (~25%) with keeping toes directly on line to indicate improving anticipatory balance in narrow conditions - Making progress, 50% stepping error  4. ? Strength: At least 5-/5 gross bilat hip strength in all planes to allow for improving stability in SLS with gait and improve gait mechanics/efficiency - Partially met, 5-/5 for all except 4+/5 hip ext  5. ? Function:    a. Pt will be able to complete household chores without rest break for at least 45 min consecutively to indicate improving functional mobility tolerance - MET  b. Pt will be able to squat to ground to lift 25# and carry while ambulating using BUEs without balance impairment noted to improve safety with carrying/lifting grandkids - MET  6.  Patient to be independent with home exercise program as demonstrated by performance with correct form without cues. - MET  7. Demonstrate Knowledge of fall prevention - MET         LTG: (to be met in 16 treatments)  1. FGA to at least 24/30 to indicate clinically significant improvement in dynamic balance within gait and reduce fall risk   2. 5x STS to no more than 15s to indicate improving overall BLE strength/power for functional transfers  3. Gait speed to at least 1.2 m/s self selected to indicate improving overall balance, safety, and progressing towards improved community speeds on level surfaces  4. Pt reports ability to resume yoga classes, regular walking without concern for balance to encourage regular aerobic exercise completion        Patient goals: \"do yoga, balance better\"    Pt. Education:  [x] Yes  [] No  [x] Reviewed Prior HEP/Ed  Method of Education: [x] Verbal  [] Demo  [] Written  Comprehension of Education:  [x] Verbalizes understanding. [x] Demonstrates understanding. [] Needs review. [] Demonstrates/verbalizes HEP/Ed previously given. Current HEP: foam head turns/nods, tandem ambulation, tandem stance, SLS, clamshells, SL hip abduction    Plan: [x] Continue current frequency toward long and short term goals.     [x] Specific Instructions for subsequent treatments: progress SLS stability bilat, higher level gait training with obstacles, head turns, etc      Time In: 10:00 am            Time Out: 11:00 am    Electronically signed by:  Caro Dumont PT

## 2021-10-20 ENCOUNTER — HOSPITAL ENCOUNTER (OUTPATIENT)
Dept: PHYSICAL THERAPY | Facility: CLINIC | Age: 71
Setting detail: THERAPIES SERIES
Discharge: HOME | End: 2021-10-20
Payer: MEDICARE

## 2021-10-20 PROCEDURE — 97116 GAIT TRAINING THERAPY: CPT

## 2021-10-20 PROCEDURE — 97112 NEUROMUSCULAR REEDUCATION: CPT

## 2021-10-20 NOTE — FLOWSHEET NOTE
[] [facility]) [facility]  [facility]  [address]  [phone]  F: [phone] [x] [address]  [place] [address]   [address]  P: [phone]  F: [phone] [] [address] &  Therapy  [address]  P: [phone]  F: [phone] [] [address]  P: [phone]  F: [phone] [] [address]  [facility]   [address]   [place]: [phone]  F: [phone]      Physical Therapy Daily Treatment Note    Date:  10/20/2021  Patient Name:  Yogi Bruno    :  1950  MRN: [id number]  Physician: Dr. Froylan Rawls: Reji [employer]ang [employer] ([address] [place] [address])  Medical Diagnosis: Relapsing remitting multiple sclerosis; Difficulty balancing  Rehab Codes: R53.1, R29.3, R26.9, R27.9  Next 's appt.: 21  Date of onset: 21  Visit# / total visits: 15/16; Progress note for Medicare patient due at visit 10    Cancels/No Shows: 0    Subjective:    Pain:  [] Yes  [x] No Location: N/A Pain Rating: (0-10 scale) 0/10  Pain altered Tx:  [x] No  [] Yes  Action:  Comments: Pt states she had a calm weekend, nothing new to report.      Objective:  Modalities:   Precautions:  Exercises: Bolded completed 10/20/2021   Exercise Reps/ Time Weight/ Level Comments   Upright bike 6 min L4                Mat      DKTC crunch 15x     DKTC extended out crunch   Held d/t increased hip flexor pain this date   Bicycles 2x10 ea  LEs only   SL bridge 10x ea  SL  = single leg   Sidelying clams 2x15 ea blueberry    Sidelying hip abd 2x15 ea blueberry Band at knees   Sidelying plank            Gym      1st position tandem stance 3x30\" ea  Easier than full tandem for pt  - tandem but with front foot externally rotated   Tandem stance 2x30\" ea  Attempted, too much UE assist   SLS 3x30\" ea  Min unilat UE assist   SLS taps to cones - reactive   4 cones: lateral, anterior, medial, diagonal   Fwd heel tap to step 2x20  12\" Holding 4# ball overhead   Lateral foot tap to step 2x30\" 12\" Holding 4# ball overhead   Step up w/ knee drive [id number] ea 6\" One hand Assist   CKC hip hike      STS with arms crossed 3x5 18\" Timed:18.57s   STS with weight   10# 22 inches from floor         Foam      Tandem stance 2x30\" ea     Half tandem, ball raises 2x10 4# NBOS - narrow base of support  - half tandem stance for second set   Half tandem, ball twists 2x10 4# Half tandem stance for second set   NBOS, ball D1/2 1x10 ea 4#    NBOS, EC  4x30\"   EC  - eyes closed               Gait train w/ step up 6 laps 1 8\" curb step    Amb with head turns 3 lap     Amb marching 1 lap ea direction     amb retro 4x40'  Increasing speed as able   amb eyes closed 4x30'  Min VCs to maintain stright path   amb tandem 4x40'     amb side step 2x30\" ea     amb carioca  2x30\" ea  Last set with holding red therapy ball   Obstacle ambulation 8 min total  Foam, hurdles, 8\" step up - random practice   Fwd mini lunge onto BOSU 1x10     Fwd lunge into warrior pose 12x ea  Rotating R         Core twists  purple Arms extended out straight, hands together   Wood chops  purple                Calf incline stretch 3x30\"     Prone hip flexor stretch 3x30\"  Pillows under thighs         Other:         Treatment Charges: Mins Units   []  Modalities     []  Ther Exercise     []  Manual Therapy     []  Ther Activities     []  Aquatics     []  Vasocompression     [x]  Other: Neuro 35 3   [x]  Other: Gait 18 1   Total Treatment time 54 4       Assessment: [x] Progressing toward goals. Pt continues to be challenged with tandem stance amb, but able to improve with repetition - still will tend to stabilize with UE/trunk erroneous movement.  Negotiating obstacles in gait path initially with pause/slowing of speed, however with repetition and practice within session is able to improve this. No narrow CLIFFORD/crossing step with ambulation noted this date, even when dual tasking with conversation - significant improvement. Still does demo instability in SLS, R>L - hip abduction strength noted to be impaired, will reinitiate hip/quad strengthening on RLE for HEP review next visit for final visit prior to D/C, as pt has made good progress and will be expected to reach LTGs at assessment next visit. [] No change. [] Other:  [x] Patient would continue to benefit from skilled physical therapy services in order to: Progress BLE hip strength, address gait mechanics, increase balance strategies and single limb stance stability, and further improve functional mobility including safe/controlled kneel to stand transfers and return to yoga classes. STG: (to be met in 8 treatments) - Assessed on 9/3/21 by Kelly Anderson, PT:  1. ? Pain: No more than 6/10 max pain in R knee/L ankle with therapy to indicate good tolerance to increased physical activity - MET, 0/10 pain reported  2. ? ROM: At least 10deg DF PROM bilat to improve gait/stair mechanics and allow improving lunge mobility for yoga classes/kneel to stand transfers - MET, 12deg L, 15deg R  3. ? Balance: Able to complete tandem amb for 25 ft with only min error (~25%) with keeping toes directly on line to indicate improving anticipatory balance in narrow conditions - Making progress, 50% stepping error  4. ? Strength: At least 5-/5 gross bilat hip strength in all planes to allow for improving stability in SLS with gait and improve gait mechanics/efficiency - Partially met, 5-/5 for all except 4+/5 hip ext  5. ? Function:    a. Pt will be able to complete household chores without rest break for at least 45 min consecutively to indicate improving functional mobility tolerance - MET  b.  Pt will be able to squat to ground to lift 25# and carry while ambulating using BUEs without balance impairment noted to improve safety with carrying/lifting grandkids - MET  6. Patient to be independent with home exercise program as demonstrated by performance with correct form without cues. - MET  7. Demonstrate Knowledge of fall prevention - MET         LTG: (to be met in 16 treatments)  1. FGA to at least 24/30 to indicate clinically significant improvement in dynamic balance within gait and reduce fall risk   2. 5x STS to no more than 15s to indicate improving overall BLE strength/power for functional transfers  3. Gait speed to at least 1.2 m/s self selected to indicate improving overall balance, safety, and progressing towards improved community speeds on level surfaces  4. Pt reports ability to resume yoga classes, regular walking without concern for balance to encourage regular aerobic exercise completion        Patient goals: \"do yoga, balance better\"    Pt. Education:  [x] Yes  [] No  [x] Reviewed Prior HEP/Ed  Method of Education: [x] Verbal  [] Demo  [] Written  Comprehension of Education:  [x] Verbalizes understanding. [x] Demonstrates understanding. [] Needs review. [] Demonstrates/verbalizes HEP/Ed previously given. Current HEP: foam head turns/nods, tandem ambulation, tandem stance, SLS, clamshells, SL hip abduction    Plan: [x] Continue current frequency toward long and short term goals.     [x] Specific Instructions for subsequent treatments: HEP review, LTG assess, discharge      Time In: 10:00 am            Time Out: 11:00 am    Electronically signed by:  Mao Banks, PT

## 2021-10-22 ENCOUNTER — HOSPITAL ENCOUNTER (OUTPATIENT)
Dept: PHYSICAL THERAPY | Facility: CLINIC | Age: 71
Setting detail: THERAPIES SERIES
Discharge: HOME | End: 2021-10-22
Payer: MEDICARE

## 2021-10-22 PROCEDURE — 97112 NEUROMUSCULAR REEDUCATION: CPT

## 2021-10-22 PROCEDURE — 97110 THERAPEUTIC EXERCISES: CPT

## 2021-10-22 PROCEDURE — 97116 GAIT TRAINING THERAPY: CPT

## 2021-10-22 NOTE — FLOWSHEET NOTE
Functional Gait Assessment (FGA)    1. Gait Level Surface: 6.76s (2)   Instructions: Walk at your normal speed from here to the next nina (6 m/20 ft)  Grading: Marylee Liter the highest category that applies. (3) Normal - Walks 6 m (20 ft) in less than 5.5 seconds, no assistive devices, good speed, no evidence for imbalance, normal gait pattern, deviates no more than 6 in. outside of the 12        in walkway width. (2) Mild impairment - Walks 6 in (20 ft) in less than 7 seconds but greater than 5.5 seconds, uses assistive device, slower speed, mild gait deviations, or deviates 6-10 in. outside of        the 12 in walkway width. (1) Moderate impairment - Walks 6 m (20 ft) slow speed, abnormal gait pattern, evidence for imbalance, or deviates 10-15 in outside of the 12 in walkway width. Requires more than 7       seconds to ambulate 6 m (20 ft). (0) Significant impairment - Cannot walk 6 m (20 ft) without assistance, severe gait deviations or imbalance, deviates greater than 15 in. outside of the 12 in. walkway width or        reaches and touches the wall. 2. Change in Gait Speed: 3  Instructions: Begin walking at your normal pace (for 1.5 m (5ft)). When I tell you \"go\", walk as fast as you can (for 1.5m (5 ft)). When I tell you \"slow\", walk as slowly as you can (for 1.5m (5ft)). Grading: Marylee Liter the highest category that applies. (3) Normal - Able to smoothly change walking speed without loss of balance or gait deviation. Shows a significant difference in walking speeds between normal, fast, and slow speeds. Deviates no more than 6 in. outside of the 12 in. walkway width. (2) Mild impairment - Is able to change speed but demonstrates mild gait deviations, deviates 6-10 in. outside of the 12 in. walkway width, or no gait deviations but        unable to achieve a significant change in velocity, or uses an assistive device.    (1) Moderate impairment - Makes only minor adjustments to walking speed, or accomplishes a change in speed with significant gait deviations, deviates 10-15 in outside the 12 in.              walkway width, or changes speed but loses balance but is able to recover and continue walking.   (0) Severe impairment - Cannot change speeds, deviates greater than 15 in outside 12 in walkway width, or loses balance and has to reach for wall or be caught    3. Gait with Horizontal Head Turns: 2  Instructions: Walk from here to the next nina 6 m (20ft) away. Begin walking at your normal pace. Keep walking straight; after 3 steps, turn your head to the right and keep walking straight while looking to the right. After 3 more steps, turn your head to the left and keep walking straight while looking left. Continue alternating looking right and left every 3 steps until you have completed 2 repetitions in each direction. Grading: Bob Linea the highest category that applies. (3) Normal - Performs head turns smoothly with no change in gait. (2) Mild impairment - Performs head turns smoothly with slight change in gait velocity (eg. Minor disruption to smooth gait path), deviates 6-10 in. outside 12 in. walkway width, or uses       an assistive device. (1) Moderate impairment - Performs head turns with moderate change in gait velocity, slows down, deviates 10-15 in. outside 12 in. walkway width but recovers, can continue to walk   (0) Severe impairment - Cannot change speeds, deviates greater than 15 in. outside 12 in. walkway width, or loses balance and has to reach for wall or be caught. 4. Gait with Vertical Head Turns: 2  Instructions: Walk from here to the next nina (6m, 20 ft). Begin walking at your normal pace. Keep walking straight; after three steps, tip your head up and keep walking straight while looking up. After 3 more steps, tip your head down, keep walking straight while looking down. Continue alternating looking up and down every 3 steps until you have completed 2 repetitions in each direction.   Grading: Ko the highest category that applies. (3) Normal - Performs head turns with no change in gait. Deviates no more than 6 in. outside 12 in. walkway width. (2) Mild impairment - Performs task with slight change in gait velocity (eg. Minor disruption to smooth gait path), deviates 6-10 in. outside 12 in. walkway width, or uses assistive device. (1) Moderate impairment - Performs task with moderate change in gait velocity, slows down, deviates 10-15 in. outside 12 in. walkway width but recovers, can continue to walk. (0) Severe impairment -Performs task with severe disruption of gait (eg. Staggers 15 in outside 12 in. walkway width, loses balance, stops, reaches for wall)    5. Gait and Pivot Turn: 3  Instructions: Begin walking at your normal pace. When I tell you, \"turn and stop\", turn as quickly as you can to face the opposite direction and stop. Grading: Perry Tidwell the highest category that applies. (3) Normal - Pivot turns safely within 3 seconds and stops quickly with no loss of balance. (2) Mild impairment - Pivot turns safely in >3 seconds and stops with no loss of balance, or pivot turns safely within 3 seconds and stops with mild imbalance, requires small steps to        catch balance. (1) Moderate impairment - Turns slowly, requires verbal cueing, or requires several small steps to catch balance following turn and stop. (0) Severe impairment - Cannot turn safely, requires assistance to turn and stop. 6. Step Over Obstacle: 2  Instructions: Begin walking at your normal speed. When you come to the shoe box, step over it, not around it, and keep walking. Grading: Perry Tidwell the highest category that applies. (3) Normal - Is able to step over 2 stacked shoe boxes taped together (9 in. total height) without changing gait speed; no evidence of imbalance. (2) Mild impairment - Is able to step over one shoe box (4.5 in total height) without changing gait speed; no evidence of imbalance.    (1) Moderate impairment - Is able to step over one shoe box (4.5 in. Total height) but must slow dwon and adjust steps to clear box safely. May require verbal cueing.   (0) Severe impairment - Cannot perform without assistance. 7. Gait with Narrow Base of Support: 1  Instructions: Walk on the floor with arms folded across the chest, feet aligned heel to toe in tandem for a distance of 12 ft. The number of steps taken in a striaght line are counted for a maximum of 10 steps. Grading: Pari Walsh the highest category that applies. (3) Normal - Is able to ambulate for 10 steps heel to toe with no staggering.   (2) Mild impairment - Ambulates 7-9 steps. (1) Moderate impairment - Ambulates 4-7 steps. (0) Severe impairment - Ambulates less than 4 steps heel to toe or cannot perform without assistance. 8. Gait with Eyes Closed: 3  Instructions: Walk at your normal speed from here to the next nina (6 m, 20 ft) with your eyes closed. Grading: Pari Walsh the highest category that applies. (3) Normal - Walks 6 m (20 ft), no assistive devices, good speed, no evidence of imbalance, normal gait pattern, deviates no more than 6 in outside 12 in. walkway width. Ambulates 6 m (20ft) in less than 7 seconds. (2) Mild impairment - Walks 6 m (20 ft), uses assistive device, slower speed, mild gait deviations, deviates 6-10 in. outside 12 in. walkway width. Ambulates 6 m (20 ft) in less than 9        seconds but greater than 7 seconds. (1) Moderate impairment - Walks 6 m (20 ft), slow speed, abnormal gait pattern, evidence for imbalance, deviates 10-15 in. outside 12 in. walkway width. Requires more than 9        seconds to ambulate 6 m (20 ft). (0) Severe impairment - Cannot walk 6 m (20 ft) without assistance, severe gait deviations or imbalance, deviates greater than 15 in. outside 12 in. walkway width or will not attempt        task. 9. Ambulating Backwards: 3  Instructions: Walk backwards until I tell you to stop.   Grading: Pari Walsh the highest category that applies. (3) Normal - Walks 6 m (20 ft), no assistive devices, good speed, no evidence for imbalance, normal gait pattern, deviates no more than 6 in. outside 12 in. walkway width. (2) Mild impairment - Walks 6 m (20 ft), uses assistive device, slower speed, mild gait deviations, deviates 6-10 in. outside 12 in. walkway width. (1) Moderate impairment - Walks 6 m (20 ft), slow speed, abnormal gait pattern, evidence for imbalance, deviates 10-15 in. outside 12 in. walkway width. (0) Severe impairment - Cannot walk 6m (20 ft) without assistance, severe gait deviations or imbalance, deviates greater than 15 in. outside 12in. walkway width or will not attempt        task. 10. Steps: 3  Instructions: Walk up these stairs as you would at home (ie. Using the rail if necessary). At the top turn around and walk down. Grading: [employer] the highest category that applies. (3) Normal - Alternating feet, no rail   (2) Mild impairment - Alternating feet, must use rail. (1) Moderate impairment - Two feet to a stair, must use rail.   (0) Severe impairment - Cannot do safely.         Total Score: 24 (maximum score = 30)      Cutoff scores:   - Non-Specific Older Adults: < or equal to 22/30 = risk of falls   - Parkinson's Disease: <15/30 = fall risk, Laurence and Yahr 1-4, <18/30 = fall risk for inpatients; Hoen and Yahr 1-4

## 2021-10-22 NOTE — DISCHARGE SUMMARY
[] [facility]) - [facility] &  Therapy  [address]  [phone]  F: [phone] [x] [address]  [facility]   [address]  P: [phone]  F: [phone] [] [place]  [address]  P: [phone]  F: [phone] [] [address]  P: [phone]  F: [phone] [] [address]  [facility]   [address]   [place]: [phone]  F: [phone]      Physical Therapy Discharge Note    Date: 10/22/2021      Patient: Chau Ramos  : 1950  MRN: [id number]    Physician: Dr. Bowser Proffer: Cheryl [employer] ([employer])  Medical Diagnosis: Relapsing remitting multiple sclerosis; Difficulty balancing  Rehab Codes: R53.1, R29.3, R26.9, R27.9  Next 's appt.: 21  Date of onset: 21  Visit# / total visits: ; Progress note for Medicare patient due at visit 10                      Cancels/No Shows: 0  Date of initial visit: 21                Date of final visit: 10/22/21      Subjective:  Pain:  []? Yes  [x]? No   Location: N/A  Pain Rating: (0-10 scale) 0/10  Pain altered Tx:  [x]? No  []? Yes  Action:  Comments: Pt arrives for final treatment session. Objective:  Test Measurements:  B hip strength:  - R hip: 4+/5 hip ext/abd/flex  - L hip: 5/5 hip ext, 4+/5 hip abd/flex        10MWT:  (10MWT: 10.65/9.53, fast 7.59)  - self selected: .99 m/s  - fast: 1.32 m/s     FGA:  ( on eval)      Function:  - walking 2-3 mi for exercise with walking poles  - less tripping/scissoring with gait though still sometimes present with multitasking or changing tasks    Assessment:  [x]? Progressing toward goals.  LTG assessment this date - pt has made good progress in gait quality, anticipatory balance, and balance within gait as evidenced by gait quality and FGA score. Still with difficulty in single limb stance and with tandem amb - emphasized these for HEP. Provided pt with hip strengthening HEP and had pt perform teach back demo to ensure carryover. Pt agreeable to discharge d/t progress made, verbalized understanding to all education. []? No change. []? Other:  [x]? Discharge     STG: (to be met in 8 treatments) - Assessed on 9/3/21 by Félix Jones, PT:  1. ? Pain: No more than 6/10 max pain in R knee/L ankle with therapy to indicate good tolerance to increased physical activity - MET, 0/10 pain reported  2. ? ROM: At least 10deg DF PROM bilat to improve gait/stair mechanics and allow improving lunge mobility for yoga classes/kneel to stand transfers - MET, 12deg L, 15deg R  3. ? Balance: Able to complete tandem amb for 25 ft with only min error (~25%) with keeping toes directly on line to indicate improving anticipatory balance in narrow conditions - Making progress, 50% stepping error  4. ? Strength: At least 5-/5 gross bilat hip strength in all planes to allow for improving stability in SLS with gait and improve gait mechanics/efficiency - Partially met, 5-/5 for all except 4+/5 hip ext  5. ? Function:    a. Pt will be able to complete household chores without rest break for at least 45 min consecutively to indicate improving functional mobility tolerance - MET  b. Pt will be able to squat to ground to lift 25# and carry while ambulating using BUEs without balance impairment noted to improve safety with carrying/lifting grandkids - MET  6. Patient to be independent with home exercise program as demonstrated by performance with correct form without cues. - MET  7. Demonstrate Knowledge of fall prevention - MET         LTG: (to be met in 16 treatments) - Assessed on 10/22/21 by Félix Jones, PT:  1.  FGA to at least 24/30 to indicate clinically significant improvement in dynamic balance within gait and reduce fall risk  - MET  2. 5x STS to no more than 15s to indicate improving overall BLE strength/power for functional transfers - Nearly met, avg 20.47s over 2 trials  3. Gait speed to at least 1.2 m/s self selected to indicate improving overall balance, safety, and progressing towards improved community speeds on level surfaces - NOT MET, .99 m/s self selected (but noted improvements in gait quality - no scissoring, more straight path trajectory)  4. Pt reports ability to resume yoga classes, regular walking without concern for balance to encourage regular aerobic exercise completion  - Partially met, not doing yoga but using walking poles and walking a trail and feels confident with this; walks 2-3 mi        Patient goals: \"do yoga, balance better\" - Partially met    Treatment to Date:  [x] Therapeutic Exercise    [] Modalities:  [] Therapeutic Activity    [] Ultrasound  [] Electrical Stimulation  [x] Gait Training     [] Massage       [] Lumbar/Cervical Traction  [x] Neuromuscular Re-education [] Cold/hotpack [] Iontophoresis: 4 mg/mL  [x] Instruction in Home Exercise Program                     Dexamethasone Sodium  [x] Manual Therapy             Phosphate 40-80 mAmin  [] Aquatic Therapy                   [] Vasocompression/    [] Other:             Game Ready    Discharge Status:     [] Pt recovered from conditions. Treatment goals were met. [x] Pt received maximum benefit. No further therapy indicated at this time. [x] Pt to continue exercise/home instructions independently. [] Therapy interrupted due to:    [] Pt has 2 or more no shows/cancels, is discontinued per our policy. [x] Pt has completed prescribed number of treatment sessions. [] Other:         Electronically signed by Mao Banks PT on 10/22/2021 at 11:03 AM      If you have any questions or concerns, please don't hesitate to call.   Thank you for your referral.

## 2021-10-22 NOTE — FLOWSHEET NOTE
[] [facility]) - [facility] &  Therapy  [address]  [phone]  F: [phone] [x] [address]  [facility] [address]   [address]  P: [phone]  F: [phone] [] [address] &  Therapy  [address]  P: [phone]  F: [phone] [] [address]  P: [phone]  F: [phone] [] [address]  [facility]   [address]   [place]: [phone]  F: [phone]      Physical Therapy Daily Treatment Note    Date:  10/22/2021  Patient Name:  Camelia Lu    :  1950  MRN: [id number]  Physician: Dr. Hartman Kosovan: [employer] ([address] [place] [address])  Medical Diagnosis: Relapsing remitting multiple sclerosis; Difficulty balancing  Rehab Codes: R53.1, R29.3, R26.9, R27.9  Next 's appt.: 21  Date of onset: 21  Visit# / total visits: ; Progress note for Medicare patient due at visit 10    Cancels/No Shows: 0    Subjective:    Pain:  [] Yes  [x] No Location: N/A Pain Rating: (0-10 scale) 0/10  Pain altered Tx:  [x] No  [] Yes  Action:  Comments: Pt arrives for final treatment session.      Objective:  B hip strength:  - R hip: 4+/5 hip ext/abd/flex  - L hip: 5/5 hip ext, 4+/5 hip abd/flex      10MWT:  (10MWT: 10.65/9.53, fast 7.59)  - self selected: .99 m/s  - fast: 1.32 m/s    FGA:  ( on eval)    Modalities:   Precautions:  Exercises: Bolded completed 10/22/2021   Exercise Reps/ Time Weight/ Level Comments   Upright bike 6 min L4                Mat      DKTC crunch 15x     DKTC extended out crunch   Held d/t increased hip flexor pain this date   Bicycles 2x10 ea  LEs only   Bridge 15x     Sidelying clams 2x15 ea blueberry    Sidelying hip abd 1x15 ea blueberry Band at knees   SLR 15x ea Prone hip ext 15x ea     Sidelying plank            Gym      1st position tandem stance 3x30\" ea  Easier than full tandem for pt  - tandem but with front foot externally rotated   Tandem stance 2x30\" ea  Attempted, too much UE assist   SLS 3x30\" ea  Min unilat UE assist   SLS taps to cones - reactive   4 cones: lateral, anterior, medial, diagonal   Fwd heel tap to step 2x20  12\" Holding 4# ball overhead   Lateral foot tap to step 2x30\" 12\" Holding 4# ball overhead   Step up w/ knee drive 1Y19 ea 6\" One hand Assist   CKC hip hike      STS with arms crossed 3x5 18\" Timed:18.57s   STS with weight   10# 22 inches from floor         Foam      Tandem stance 2x30\" ea     Half tandem, ball raises 2x10 4# NBOS - narrow base of support  - half tandem stance for second set   Half tandem, ball twists 2x10 4# Half tandem stance for second set   NBOS, ball D1/2 1x10 ea 4#    NBOS, EC  4x30\"   EC  - eyes closed               Gait train w/ step up 6 laps 1 8\" curb step    Amb with head turns 3 lap     Amb marching 1 lap ea direction     amb retro 4x40'  Increasing speed as able   amb eyes closed 4x30'  Min VCs to maintain stright path   amb tandem 4x40'     amb side step 2x30\" ea     amb carioca  2x30\" ea  Last set with holding red therapy ball   Obstacle ambulation 8 min total  Foam, hurdles, 8\" step up - random practice   Fwd mini lunge onto BOSU 1x10     Fwd lunge into warrior pose 12x ea  Rotating R         Core twists  purple Arms extended out straight, hands together   Wood chops  purple                Calf incline stretch 3x30\"     Prone hip flexor stretch 3x30\"  Pillows under thighs         Other: Most time spent in LTG assessment this date including 10MWT, FGA, and strength assessment.  Educated pt on benefits of regular aerobic exercise/strength training with MS - all billed appropriately as noted below (gait, balance, therex)        Treatment Charges: Mins Units   []  Modalities     [x]  Ther Exercise 10 1   []  Manual Therapy     []  Ther Activities     []  Aquatics     []  Vasocompression     [x]  Other: Neuro 8 1   [x]  Other: Gait 22 1   Total Treatment time 40 3       Assessment: [x] Progressing toward goals. LTG assessment this date - pt has made good progress in gait quality, anticipatory balance, and balance within gait as evidenced by gait quality and FGA score. Still with difficulty in single limb stance and with tandem amb - emphasized these for HEP. Provided pt with hip strengthening HEP and had pt perform teach back demo to ensure carryover. Pt agreeable to discharge d/t progress made, verbalized understanding to all education. [] No change. [] Other:  [x] Discharge    STG: (to be met in 8 treatments) - Assessed on 9/3/21 by Kip Rinaldi, PT:  1. ? Pain: No more than 6/10 max pain in R knee/L ankle with therapy to indicate good tolerance to increased physical activity - MET, 0/10 pain reported  2. ? ROM: At least 10deg DF PROM bilat to improve gait/stair mechanics and allow improving lunge mobility for yoga classes/kneel to stand transfers - MET, 12deg L, 15deg R  3. ? Balance: Able to complete tandem amb for 25 ft with only min error (~25%) with keeping toes directly on line to indicate improving anticipatory balance in narrow conditions - Making progress, 50% stepping error  4. ? Strength: At least 5-/5 gross bilat hip strength in all planes to allow for improving stability in SLS with gait and improve gait mechanics/efficiency - Partially met, 5-/5 for all except 4+/5 hip ext  5. ? Function:    a. Pt will be able to complete household chores without rest break for at least 45 min consecutively to indicate improving functional mobility tolerance - MET  b. Pt will be able to squat to ground to lift 25# and carry while ambulating using BUEs without balance impairment noted to improve safety with carrying/lifting grandkids - MET  6.  Patient to be independent with home exercise program as demonstrated by performance with correct form without cues. - MET  7. Demonstrate Knowledge of fall prevention - MET         LTG: (to be met in 16 treatments) - Assessed on 10/22/21 by Kirti Reynoso, PT:  1. FGA to at least 24/30 to indicate clinically significant improvement in dynamic balance within gait and reduce fall risk  - MET  2. 5x STS to no more than 15s to indicate improving overall BLE strength/power for functional transfers - Nearly met, avg 20.47s over 2 trials  3. Gait speed to at least 1.2 m/s self selected to indicate improving overall balance, safety, and progressing towards improved community speeds on level surfaces - NOT MET, .99 m/s self selected (but noted improvements in gait quality - no scissoring, more straight path trajectory)  4. Pt reports ability to resume yoga classes, regular walking without concern for balance to encourage regular aerobic exercise completion  - Partially met, not doing yoga but using walking poles and walking a trail and feels confident with this; walks 2-3 mi        Patient goals: \"do yoga, balance better\" - Partially met    Pt. Education:  [x] Yes  [] No  [x] Reviewed Prior HEP/Ed  Method of Education: [x] Verbal  [] Demo  [x] Written  Comprehension of Education:  [x] Verbalizes understanding. [x] Demonstrates understanding. [] Needs review. [] Demonstrates/verbalizes HEP/Ed previously given. Access Code: [email or web address]  URL: [email or web address]  Date: 10/22/2021  Prepared by: Kirti Reynoso    Exercises  Prone Hip Extension - 1 x daily - 2 x weekly - 2-3 sets - 15 reps  Sidelying Hip Abduction - 1 x daily - 2 x weekly - 2-3 sets - 15 reps  Active Straight Leg Raise with Quad Set - 1 x daily - 2-3 x weekly - 2-3 sets - 15 reps  Supine Bridge - 1 x daily - 2 x weekly - 2-3 sets - 10 reps  Tandem Walking with Counter Support - 1 x daily - 2 x weekly - 2-3 reps - 2-3 min hold  Single Leg Stance with Support - 1 x daily - 2 x weekly - 5 reps - 20 sec hold      Plan: [x] Discharge      Time In: 10:00 am            Time Out: 11:00 am    Electronically signed by:  Sudeep Caro PT

## 2022-02-21 DIAGNOSIS — E55.9 VITAMIN D DEFICIENCY: ICD-10-CM

## 2022-02-21 RX ORDER — MELATONIN
Qty: 90 TABLET | Refills: 3 | OUTPATIENT
Start: 2022-02-21

## 2022-03-29 ENCOUNTER — OFFICE VISIT (OUTPATIENT)
Dept: NEUROLOGY | Age: 72
End: 2022-03-29
Payer: MEDICARE

## 2022-03-29 DIAGNOSIS — G35 MULTIPLE SCLEROSIS (HCC): Primary | ICD-10-CM

## 2022-03-29 DIAGNOSIS — F33.42 RECURRENT MAJOR DEPRESSIVE DISORDER, IN FULL REMISSION (HCC): ICD-10-CM

## 2022-03-29 DIAGNOSIS — G35 RELAPSING REMITTING MULTIPLE SCLEROSIS (HCC): ICD-10-CM

## 2022-03-29 DIAGNOSIS — R53.83 OTHER FATIGUE: ICD-10-CM

## 2022-03-29 PROCEDURE — 99214 OFFICE O/P EST MOD 30 MIN: CPT | Performed by: NURSE PRACTITIONER

## 2022-03-29 RX ORDER — VENLAFAXINE HYDROCHLORIDE 37.5 MG/1
37.5 CAPSULE, EXTENDED RELEASE ORAL DAILY
Qty: 90 CAPSULE | Refills: 3 | Status: SHIPPED | OUTPATIENT
Start: 2022-03-29 | End: 2022-09-28 | Stop reason: DRUGHIGH

## 2022-04-26 ENCOUNTER — HOSPITAL ENCOUNTER (OUTPATIENT)
Dept: MRI IMAGING | Facility: CLINIC | Age: 72
Discharge: HOME | End: 2022-04-28
Payer: MEDICARE

## 2022-04-26 DIAGNOSIS — G35 MULTIPLE SCLEROSIS (HCC): ICD-10-CM

## 2022-04-26 LAB — POC CREATININE: 0.9 MG/DL (ref 0.6–1.4)

## 2022-04-26 PROCEDURE — A9579 GAD-BASE MR CONTRAST NOS,1ML: HCPCS | Performed by: NURSE PRACTITIONER

## 2022-04-26 PROCEDURE — 70553 MRI BRAIN STEM W/O & W/DYE: CPT

## 2022-04-26 PROCEDURE — 72156 MRI NECK SPINE W/O & W/DYE: CPT

## 2022-04-26 PROCEDURE — 6360000004 HC RX CONTRAST MEDICATION: Performed by: NURSE PRACTITIONER

## 2022-04-26 PROCEDURE — 82565 ASSAY OF CREATININE: CPT

## 2022-04-26 RX ADMIN — GADOTERIDOL 13 ML: 279.3 INJECTION, SOLUTION INTRAVENOUS at 13:03

## 2022-04-28 ENCOUNTER — TELEPHONE (OUTPATIENT)
Dept: NEUROLOGY | Age: 72
End: 2022-04-28

## 2022-04-28 DIAGNOSIS — G35 RELAPSING REMITTING MULTIPLE SCLEROSIS (HCC): ICD-10-CM

## 2022-04-28 RX ORDER — INTERFERON BETA-1A 30MCG/.5ML
KIT INTRAMUSCULAR
Qty: 1 EACH | Refills: 6 | Status: CANCELLED | OUTPATIENT
Start: 2022-04-28

## 2022-04-28 NOTE — TELEPHONE ENCOUNTER
I called and lm for Temo to return my call. I need ot know which specialty pharmacy her insurance uses and then also mentioned if she is interesting in seeing if she qualifies for pt. assistance through the company then she can go online and contact them.

## 2022-04-28 NOTE — TELEPHONE ENCOUNTER
----- Message from Bartolo Lundborg, APRN - CNP sent at 4/27/2022  2:08 PM EDT -----  Patient was contacted regarding her MRI results. 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 There is no active enhancement at this time. The MRI of her brain and cervical spine have remained stable. She had been on Avonex for many years, but stopped it in January 2021. Because of the new results, she wishes to go back on Avonex at this time. She was given other options including Tecfidera and because symptoms. Because she had had no relapses while taking Avonex, she wishes to return back on that medication.

## 2022-05-02 RX ORDER — INTERFERON BETA-1A 30MCG/.5ML
KIT INTRAMUSCULAR
COMMUNITY
End: 2022-05-13 | Stop reason: SDUPTHER

## 2022-05-02 NOTE — TELEPHONE ENCOUNTER
Temo called back. She spoke with [employer] support and said they need a referral form us. I called [employer] support and they said to fax them the START form and then they can contact Temo to get her portion completed and then they forward it on to her specialty pharmacy. I called and left this message for Temo. START form faxed.

## 2022-05-10 ENCOUNTER — TELEPHONE (OUTPATIENT)
Dept: NEUROLOGY | Age: 72
End: 2022-05-10

## 2022-05-10 NOTE — TELEPHONE ENCOUNTER
PA submitted via [facility] for Avenox. PA resubmitted to [facility]. Approved. Patient notified.

## 2022-05-13 DIAGNOSIS — G35 RELAPSING REMITTING MULTIPLE SCLEROSIS (HCC): Primary | ICD-10-CM

## 2022-05-13 NOTE — TELEPHONE ENCOUNTER
Temo called and is somewhat confused about her Avonex. Patient has called [facility] and [facility] looking for her medication. I spoke with [facility] and they are waiting for a prescription so they can run it through her insurance and/or see if a PA is needed. Please advise.

## 2022-05-15 RX ORDER — INTERFERON BETA-1A 30MCG/.5ML
KIT INTRAMUSCULAR
Qty: 4 EACH | Refills: 5 | Status: SHIPPED | OUTPATIENT
Start: 2022-05-15

## 2022-06-13 DIAGNOSIS — G35 RELAPSING REMITTING MULTIPLE SCLEROSIS (HCC): Primary | ICD-10-CM

## 2022-06-14 NOTE — TELEPHONE ENCOUNTER
If it only happened 1 time, I am not concerned, but if it becomes recurrent, I would probably do MRI imaging to make sure she is disease not having a relapse of her multiple sclerosis. I would recommend taking a low-dose of a muscle relaxer called tizanidine at bedtime daily. If she is agreeable, I will submit the orders.

## 2022-06-21 RX ORDER — TIZANIDINE HYDROCHLORIDE 4 MG/1
4 CAPSULE, GELATIN COATED ORAL NIGHTLY
Qty: 30 CAPSULE | Refills: 2 | Status: SHIPPED | OUTPATIENT
Start: 2022-06-21 | End: 2022-08-31

## 2022-06-21 NOTE — TELEPHONE ENCOUNTER
I called and informed her that if the body jerking happens again we can order an MRI to see if she is having a relapse of her MS. She stated that was recently told that she has a new lesion on her T spine and was thinking that was the reason this happened. I told her that I would mention this to you. I also stated that we could send in the Zanaflex that she could take at night to help prevent this. She stated that she would like that sent into her pharmacy.

## 2022-07-11 DIAGNOSIS — F32.A DEPRESSION, UNSPECIFIED DEPRESSION TYPE: ICD-10-CM

## 2022-07-11 DIAGNOSIS — G35 RELAPSING REMITTING MULTIPLE SCLEROSIS (HCC): ICD-10-CM

## 2022-07-11 RX ORDER — VENLAFAXINE HYDROCHLORIDE 75 MG/1
CAPSULE, EXTENDED RELEASE ORAL
Qty: 90 CAPSULE | Refills: 3 | OUTPATIENT
Start: 2022-07-11

## 2022-07-14 ENCOUNTER — TELEPHONE (OUTPATIENT)
Dept: NEUROLOGY | Age: 72
End: 2022-07-14

## 2022-07-14 NOTE — TELEPHONE ENCOUNTER
Patient's last OV 3/29/2022 she was hoping to reduce her dosage of the 75 mg of Effexor XR as she did not feel significantly depressed at that time. On 4/27/2022 patient was contacted about her MRI results and because of the new results patient is back on MS medication. Patient became depressed about the exacerbation of the MS and started taking 75 mg of the Effexor XR. Patient ran out of the 75 mg but has a prescription for 37.5 mg tablet 90 day supply with 3 refills. I recommended the patient take 2 tablets daily and when she needs more medication we can write her a new prescription.

## 2022-09-28 DIAGNOSIS — F32.A DEPRESSION, UNSPECIFIED DEPRESSION TYPE: ICD-10-CM

## 2022-09-28 DIAGNOSIS — G35 RELAPSING REMITTING MULTIPLE SCLEROSIS (HCC): ICD-10-CM

## 2022-09-28 NOTE — TELEPHONE ENCOUNTER
Patient called the office this morning requesting a new Effexor XR 75 mg. Patient states that she had previously requested the dose be lowered and a new Rx was given. Later she then requested that the dose be increased back to 75 mg and has been using two of the lower dose capsules but only has four days left. Patient informed that there is a note sent to Ken Narayan on 7/14/22 regarding this, however I didn't see a reply stating that this would be okay. Patient informed that I would confirm with Ken Narayan that this was alright and then send a new Rx to her for approval.  Since the patient only has a four day supply left a 10 day supply Rx will be sent to [facility] as well. Patient verbally stated her understanding. This was discussed with Ken Narayan and she was fine with the dose adjustment back to 75 mg dose.         Medication active on med list: yes      Date of last fill: 3/29/22 for #90 and 3 refills  verified on 9/28/2022    verified by [email or web address]      Date of last appointment: 3/29/2022    Next Visit Date:  12/13/2022

## 2022-09-29 RX ORDER — VENLAFAXINE HYDROCHLORIDE 75 MG/1
75 CAPSULE, EXTENDED RELEASE ORAL DAILY
Qty: 10 CAPSULE | Refills: 0 | Status: SHIPPED | OUTPATIENT
Start: 2022-09-29 | End: 2022-10-09

## 2022-09-29 RX ORDER — VENLAFAXINE HYDROCHLORIDE 75 MG/1
CAPSULE, EXTENDED RELEASE ORAL
Qty: 90 CAPSULE | Refills: 0 | Status: SHIPPED | OUTPATIENT
Start: 2022-09-29

## 2022-12-12 DIAGNOSIS — G35 RELAPSING REMITTING MULTIPLE SCLEROSIS (HCC): ICD-10-CM

## 2022-12-12 DIAGNOSIS — F32.A DEPRESSION, UNSPECIFIED DEPRESSION TYPE: ICD-10-CM

## 2022-12-12 RX ORDER — VENLAFAXINE HYDROCHLORIDE 75 MG/1
CAPSULE, EXTENDED RELEASE ORAL
Qty: 90 CAPSULE | Refills: 0 | Status: SHIPPED | OUTPATIENT
Start: 2022-12-12

## 2022-12-12 NOTE — TELEPHONE ENCOUNTER
Pharmacy requesting refill of venlafaxine 75 mg.       Medication active on med list yes      Date of last Rx: 9/29/2022 with 0 refills          verified by MARY CARMEN, PAULIEA      Date of last appointment 3/29/2022    Next Visit Date:  12/13/2022

## 2022-12-13 ENCOUNTER — OFFICE VISIT (OUTPATIENT)
Dept: NEUROLOGY | Age: 72
End: 2022-12-13
Payer: MEDICARE

## 2022-12-13 VITALS
HEIGHT: 68 IN | WEIGHT: 155 LBS | BODY MASS INDEX: 23.49 KG/M2 | DIASTOLIC BLOOD PRESSURE: 82 MMHG | SYSTOLIC BLOOD PRESSURE: 159 MMHG | HEART RATE: 64 BPM

## 2022-12-13 DIAGNOSIS — G35 RELAPSING REMITTING MULTIPLE SCLEROSIS (HCC): Primary | ICD-10-CM

## 2022-12-13 DIAGNOSIS — F33.40 RECURRENT MAJOR DEPRESSIVE DISORDER, IN REMISSION (HCC): ICD-10-CM

## 2022-12-13 DIAGNOSIS — N31.9 NEUROGENIC BLADDER: ICD-10-CM

## 2022-12-13 PROCEDURE — 1123F ACP DISCUSS/DSCN MKR DOCD: CPT | Performed by: NURSE PRACTITIONER

## 2022-12-13 PROCEDURE — 99214 OFFICE O/P EST MOD 30 MIN: CPT | Performed by: NURSE PRACTITIONER

## 2022-12-13 NOTE — PROGRESS NOTES
[facility]            [address]          [place], [address] [facility]          Dept: [phone]          Dept Fax: [phone]    MD Chela Carter, MD Addison Barry, CNP            12/13/2022      HISTORY OF PRESENT ILLNESS:       I had the pleasure of seeing Coleen Mcdonnell, who returns for continuing neurologic care. The patient was seen last on March 29,2022 for treatment of relapsing remitting multiple sclerosis, depression and a vitamin D deficiency. The patient has relapsing remitting multiple sclerosis and was not prescribed disease modifying therapy at her last visit. Surveillance MRIs of her brain and cervical spine were completed in April 2022 showing acute demyelination within the upper thoracic cord from T1 through T3 without post contrast enhancement. She was then recommended to restart disease modifying therapy and she requested to restart avonex at that time. She is here today reporting that she has remained well controlled since restarting avonex. She describes an event in June 2022 when she was lying down for bed and began to develop twitching of her bilateral upper extremities. She denies any associated bladder infections or metabolic abnormalities. Multiple Sclerosis Disease Course  Onset of Symptoms: Date of onset: 1979 (initial right optic neuritis along with lower extremity numbness and gait difficulties)   Date of diagnosis of MS: 1979   Disease course at onset: Relapsing-Remitting  Current disease course: Relapsing-Remitting  Previous disease therapies: Avonex (1997-February 2020)  Current disease therapy: Avonex (since June 2022)  CSF: done many yrs ago; complete details not available; old record notes say \"elevated IgG synthesis\".    Vitamin D:  39.8 (12/31/2019)    Smoking status:  never smoked  EDSS: 2.5 with pyramidal dysfx & bladder dysfunction & gait disturbance    For management of her depression she was prescribed effexor 37.5 mg daily. For management of her vitamin D deficiency she was prescribed vitamin D replacement at 1000 units daily. Testing reviewed:    MRI Brain W WO Contrast 4/27/2022  Impression   Stable moderate number of hyperintense FLAIR signal lesions within the   periventricular white matter of both cerebral hemispheres compatible with a   demyelinating process in a patient with history of multiple sclerosis. No   new lesions are identified. No abnormal enhancement to suggest active demyelination. MRI Cervical Spine W WO Contrast 4/27/2022  Impression   Stable demyelinating plaques at C2, C5-6 and C6-7       New demyelination within the upper thoracic cord from T1 through T3. No   abnormal enhancement is identified within the cord to suggest active   demyelination. Stable mild-to-moderate foraminal stenosis at C5-6 and C6-7. New mild left foraminal stenosis at C4-5. MRI Brain (w/wo) (8/9/17): Multiple T2 FLAIR hyperintense lesions within the supratentorial compartment compatible with the clinical diagnosis of multiple sclerosis. No new lesions are clearly identified. No associated contrast enhancement or restricted diffusion noted. MRI Cervical Spine (8/9/17): T2 hyperintense lesions are seen in cervical cord; at C2, C5-C6, T2 level; which appear similar to the prior exam on 12/18/14. No new lesions clearly identified. No associated enhancement. Degenerative changes of cervical spine contributing to minimal spinal canal stenosis at C6-C7. No spinal canal stenosis at remaining levels. Degenerative changes contributing to neuroforaminal narrowing noted. Visual evoked potential testing (1987): Abnormal on right side. CSF study (1987) abnormal with increased IgG synthesis.      VITAMIN D 25 hydroxy level (4/26/18): 18.6      MRI brain (with/without) 2/11/2020: Study is compared with 8/9/2017 study; demyelinating lesions are present consistent with history of MS. No evidence for active or acute demyelination. MRI cervical spine (with/without) 2/11/2020: Study is compared with 8/9/2017 study; demyelinating lesions in the cervical spinal cord is consistent with multiple sclerosis. But no evidence for active or acute demyelination      PAST MEDICAL HISTORY:         Diagnosis Date    Caffeine use     1 coffee/day    Constipation     Depression, major, recurrent (HCC)     Treated with EFFEXOR. Diverticulitis     Hypertension     Hypothyroid 1996    Thyroid cancer surgically removed. Treated with LEVOXYL. Irregular menses     resolved    Migraine with aura     Remote problem    Mononucleosis 1971    Caused Erythema Nodosum    Multiple sclerosis ([address]     Osteoporosis     Treated with FOSAMAX. Relapsing remitting multiple sclerosis ([facility]     Thyroid cancer ([facility] [address]     Urinary incontinence     Urinary retention     Vaginal atrophy         PAST SURGICAL HISTORY:         Procedure Laterality Date    COLONOSCOPY      DILATION AND CURETTAGE  2/2005    with h/s    THYROIDECTOMY      Removed in 2 surgeries due to cancer.     TONSILLECTOMY          SOCIAL HISTORY:     Social History     Socioeconomic History    Marital status:      Spouse name: Not on file    Number of children: Not on file    Years of education: Not on file    Highest education level: Not on file   Occupational History    Not on file   Tobacco Use    Smoking status: Never    Smokeless tobacco: Never   Vaping Use    Vaping Use: Never used   Substance and Sexual Activity    Alcohol use: Yes     Comment: rarely    Drug use: No    Sexual activity: Not Currently     Partners: Male   Other Topics Concern    Not on file   Social History Narrative    Not on file     Social Determinants of Health     Financial Resource Strain: Not on file   Food Insecurity: Not on file   Transportation Needs: Not on file   Physical Activity: Not on file   Stress: Not on file   Social Connections: Not on file   Intimate Partner Violence: Not on file   Housing Stability: Not on file       CURRENT MEDICATIONS:     Current Outpatient Medications   Medication Sig Dispense Refill    venlafaxine (EFFEXOR XR) 75 MG extended release capsule TAKE 1 CAPSULE DAILY 90 capsule 0    oxybutynin (DITROPAN-XL) 10 MG extended release tablet TAKE 1 TABLET DAILY 90 tablet 3    Interferon Beta-1a (AVONEX PEN) 30 MCG/0.5ML AJKT Inject once a week 4 each 5    Misc Natural Products (HIMALAYAN GOJI PO) Take by mouth      vitamin D3 (CHOLECALCIFEROL) 25 MCG (1000 UT) TABS tablet Take 1 tablet by mouth daily 90 tablet 1    amLODIPine (NORVASC) 5 MG tablet       Magnesium 500 MG TABS Take 500 mg by mouth daily       COD LIVER OIL PO Take by mouth daily      levothyroxine (SYNTHROID) 150 MCG tablet Take 1 tablet by mouth daily. Cranberry 400 MG TABS Take 400 mg by mouth 2 times daily Takes Cranactin from [facility] ordered by urology        No current facility-administered medications for this visit. ALLERGIES:     Allergies   Allergen Reactions    No Known Allergies                                  REVIEW OF SYSTEMS        All items selected indicate a positive finding. Those items not selected are negative.   Constitutional [] Weight loss/gain   [] Fatigue  [] Fever/Chills   HEENT [] Hearing Loss  [] Visual Disturbance  [] Tinnitus  [] Eye pain   Respiratory [] Shortness of Breath  [] Cough  [] Snoring   Cardiovascular [] Chest Pain  [] Palpitations  [] Lightheaded   GI [] Constipation  [] Diarrhea  [] Swallowing change  [] Nausea/vomiting    [] Urinary Frequency  [] Urinary Urgency   Musculoskeletal [] Neck pain  [] Back pain  [] Muscle pain  [] Restless legs   Dermatologic [] Skin changes   Neurologic [] Memory loss/confusion  [] Seizures  [] Trouble walking or imbalance  [] Dizziness  [] Sleep disturbance  [] Weakness  [] Numbness  [] Tremors  [] Speech Difficulty  [] Headaches  [] Light Sensitivity  [] Sound Sensitivity   Endocrinology []Excessive thirst  []Excessive hunger   Psychiatric [x] Anxiety/Depression  [] Hallucination   Allergy/immunology []Hives/environmental allergies   Hematologic/lymph [] Abnormal bleeding  [] Abnormal bruising         PHYSICAL EXAMINATION:       Vitals:    12/13/22 0935   BP: (!) 159/82   Pulse: 64                                              .                                                                                                    General Appearance:  Alert, cooperative, no signs of distress, appears stated age   Head:  Normocephalic, no signs of trauma   Eyes:  Conjunctiva/corneas clear;  eyelids intact   Ears:  Normal external ear and canals   Nose: Nares normal, mucosa normal, no drainage    Throat: Lips and tongue normal; teeth normal;  gums normal   Neck: Supple, intact flexion, extension and rotation;   trachea midline;  no adenopathy;   thyroid: not enlarged;   no carotid pulse abnormality   Back:   Symmetric, no curvature, ROM adequate   Lungs:   Respirations unlabored   Heart:  Regular rate and rhythm           Extremities: Extremities normal, no cyanosis, no edema   Pulses: Symmetric over head and neck   Skin: Skin color, texture normal, no rashes, no lesions                                     NEUROLOGIC EXAMINATION    Neurologic Exam  Mental status    Alert and oriented x 3; intact memory with no confusion, speech or language problems; no hallucinations or delusions  Fund of information appropriate for level of education    Cranial nerves    II - visual fields intact to confrontation bilaterally  III, IV, VI - extra-ocular muscles full: no pupillary defect; no MAREK, no nystagmus, no ptosis   V - normal facial sensation                                                               VII - normal facial symmetry                                                             VIII - intact hearing                                                                             IX, X - symmetrical palate                                                                  XI - symmetrical shoulder shrug                                                       XII - tongue midline without atrophy or fasciculation      Motor function  Normal muscle bulk and tone; strength 5/5 on all 4 extremities, no pronator drift      Sensory function Intact to light touch, pinprick, vibration, proprioception on all 4 extremities      Cerebellar Intact fine motor movement. No involuntary movements or tremors. No ataxia or dysmetria on finger to nose or heel to shin testing      Reflex function DTR 2+ on bilateral UE and LE, symmetric. Down going toes bilaterally      Gait                   normal base and arm swing                  Medical Decision Making: In summary, your patient, Phil Myers exhibits the following, with associated plan:    Relapsing remitting multiple sclerosis with MRI of her cervical spine in April 2022 showing acute demyelination at T1 through T3. She describes an event in June 2022 where she began to develop twitching of her bilateral upper extremities however this only lasted for one day. Continue avonex for disease modifying therapy   We will obtain repeat surveillance MRIs of her brain, cervical spine and thoracic spine W WO contrast in April 2023.  If her MRIs reveal any acute or active demyelination we will proceed with a new disease modifying therapy, likely kesimpta   Depression, which is relatively well controlled  Continue Effexor 75 mg daily   Vitamin D deficiency  Continue vitamin D 1000 units daily  Return for follow up visit in 4 months            Signed: Latrell Cuevas CNP      *Please note that portions of this note were completed with a voice recognition program.  Although every effort was made to insure the accuracy of this automated transcription, some errors in transcription may have occurred, occasionally words and are mis-transcribed    Provider Attestation: The documentation recorded by the scribe accurately reflects the service I personally performed and the decisions made by myself. Portions of this note were transcribed by a scribe. I personally performed the history, physical exam, and medical decision-making and confirm the accuracy of the information in the transcribed note. Scribe Attestation:   By signing my name below, Doreen Biggs, attest that this documentation has been prepared under the direction and in the presence of Latrell Cuevas CNP.

## 2022-12-20 NOTE — PROGRESS NOTES
[facility]            [place], [address]          [facility], [address] [facility]          Dept: [phone]          Dept Fax: [phone]    MD Evita Elias MD Ahmed B. Shayla Mail, MD Thomasenia Anis, MD Vevelyn Como, CNP            3/29/2022      HISTORY OF PRESENT ILLNESS:       I had the pleasure of seeing Rick Banks, who returns for continuing neurologic care. The patient was seen last on May 20, 2021 for treatment of relapsing remitting multiple sclerosis depression, and vitamin D deficiency. The patient was previously seen by Dr. Yvrose Galvan and all of his records as well as her imaging studies were carefully reviewed. The patient was diagnosed in 1979 with multiple sclerosis which began with an optic neuritis. She has lesions both in her brain and cervical spine. She had been on Avonex since that time and recently stopped in February 2020. The patient's main complaint include fatigue, and sensory symptoms in her bilateral lower extremities which have been present since 2017. Treatment of fatigue includes amantadine and Ritalin in the past, however she did not receive much benefit. The patient is treated for depression with Effexor 75 mg daily. The patient is hoping to reduce her dosage of that medication, as she does not feel significantly depressed at this time. Multiple Sclerosis Disease Course  Onset of Symptoms: Date of onset: 1979 (initial right optic neuritis along with lower extremity numbness and gait difficulties)   Date of diagnosis of CN: 7141   Disease course at onset: Relapsing-Remitting  Current disease course: Relapsing-Remitting  Previous disease therapies: Avonex    Current disease therapy: Avonex (1990s -present)  CSF: done many yrs ago; complete details not available; old record notes say \"elevated IgG synthesis\".    Vitamin D:  39.8 (12/31/2019)    Smoking status:  never smoked  EDSS: 2.5 with [facility]    Medicine Progress Note - Hospitalist Service    Date of Admission:  12/14/2022    Assessment & Plan      Jami Cordon is a 75 year old female with diabetes, hypertension, dyslipidemia, h/o pulmonary embolism, paroxysmal atrial fibrillation, on Eliquis, asthma/COPD, diastolic CHF, CKD III, hypothyroidism, GERD, duodenal ulcer, IBS, anxiety/depression, SANDI, Parkinson'sdisease, overactive bladder, who presented to ED on 12/14/2022 with generalized weakness, failure to thrive, dyspnea on exertion.  She was found to have acute saddle pulmonary embolism with right heart strain.     She had not been taking Eliquis at home due to nausea and vomiting.  Patient declined thrombectomy and IVC filter placement.  She declined all invasive procedures.  She was treated initially with IV heparin infusion and subsequently transitioned to subcu Lovenox with plan to resume Eliquis at discharge.     She has had recent hospitalizations for nausea and vomiting. She was admitted from 11/1 to 11/15/22 due to nausea and vomiting.  Underwent EGD on 12/3/2022 by Netta and found to have duodenal ulcers.  Was switched from omeprazole to Protonix twice daily.  Continues to have nausea and vomiting and poor oral intake.  Because of nausea and vomiting, she had not been taking her medications.      is unable to take care of patient at home.  Palliative care consulted.  Patient interested in comfort focused treatment and plans to enroll in hospice after discharge.         Acute saddle pulmonary embolism with right heart strain  H/o PE (on Eliquis PTA)  NSTEMI due to type II MI-secondary to acute PE with right heart strain  Nonocclusive DVT in left popliteal, posterior, tibial and peroneal veins  Medication noncompliance  - D dimer elevated at 17.35; hemodynamically stable and no chest pain.  Not hypoxic  - CT chest 12/14- showed - Saddle PE with large amount of thrombus in right and left main  pulmonary arteries extending into the lobar and segmental branches of all lobes. Moderate right heart strain.   - Lower extremity ultrasound showed nonocclusive DVT in left lower extremity  - Echo 12/15 showed normal LV systolic function with EF 65-70%, no wall motion abnormalities, normal RV size and systolic function.  - pulmonary embolism is likely due to noncompliance with apixaban and sedentary status  - IR consulted.  Patient declined both thrombectomy and IVC filter placement.  She does not want any invasive procedures.    - Initially started on IV heparin infusion but due to consistently elevated PTT, switched to Lovenox 70 mg twice daily on 12/16.  Platelets noted to be 98 on 12/18.  Due to concern of HIT was switched to argatroban.  HIT screen negative.  - Will switch to apixaban on 12/20  - Palliative care consulted.  No plan to escalate cares to ICU levels if she worsens    Acute toxic encephalopathy, 12/18/2022  -Patient was obtunded on 12/18  -Head CT showed no acute change. ABGs showed no hypercapnia, electrolytes okay, no evidence of infection.  -Most likely medication related-due to Lyrica and trazodone.  Lyrica was a new medication.    -Mental status improved 12/19.  Back to baseline on 12/20   - discontinue Lyrica and trazodone      Acute thrombocytopenia, platelets 174 --> 98 (12/18) --> 192 (12/19)  -Most likely due to consumption from pulmonary embolism or platelet clumping.  Platelets in normal range on 12/19  -Low probability for heparin-induced thrombocytopenia.  T4 score of 3. HIT screen negative   -switch to apixaban 12/20    Acute anemia, hemoglobin 13.2 --> 10.3  -Hemoglobin has declined from 13.2 to 10.3.  This is most likely secondary to pulmonary embolism and hemodilution.  No other obvious bleed noted.  Hemoglobin is now stable  -Monitor     Intermittent nausea/vomiting  Duodenal ulcers -on EGD 12/3/2022  Poor PO intake  Failure to thrive  - has had recent hospitalizations for  pyramidal dysfx & bladder dysfunction & gait disturbance    Testing reviewed:    MRI Brain (w/wo) (8/9/17): Multiple T2 FLAIR hyperintense lesions within the supratentorial compartment compatible with the clinical diagnosis of multiple sclerosis. No new lesions are clearly identified. No associated contrast enhancement or restricted diffusion noted.     MRI Cervical Spine (8/9/17): T2 hyperintense lesions are seen in cervical cord; at C2, C5-C6, T2 level; which appear similar to the prior exam on 12/18/14. No new lesions clearly identified. No associated enhancement. Degenerative changes of cervical spine contributing to minimal spinal canal stenosis at C6-C7. No spinal canal stenosis at remaining levels. Degenerative changes contributing to neuroforaminal narrowing noted.     Visual evoked potential testing (1987): Abnormal on right side. CSF study (1987) abnormal with increased IgG synthesis.     VITAMIN D 25 hydroxy level (4/26/18): 18.6      MRI brain (with/without) 2/11/2020: Study is compared with 8/9/2017 study; demyelinating lesions are present consistent with history of MS.  No evidence for active or acute demyelination.         MRI cervical spine (with/without) 2/11/2020: Study is compared with 8/9/2017 study; demyelinating lesions in the cervical spinal cord is consistent with multiple sclerosis.  But no evidence for active or acute demyelination              PAST MEDICAL HISTORY:         Diagnosis Date    Caffeine use     1 coffee/day    Constipation     Depression, major, recurrent (HCC)     Treated with EFFEXOR.  Diverticulitis     Hypertension     Hypothyroid 1996    Thyroid cancer surgically removed. Treated with LEVOXYL.  Irregular menses     resolved    Migraine with aura     Remote problem    Mononucleosis 1971    Caused Erythema Nodosum    Multiple sclerosis ([address]     Osteoporosis     Treated with FOSAMAX.     Relapsing remitting multiple sclerosis ([address]     [facility]     Urinary incontinence     Urinary retention     Vaginal atrophy         PAST SURGICAL HISTORY:         Procedure Laterality Date    COLONOSCOPY      DILATION AND CURETTAGE  2/2005    with h/s    THYROIDECTOMY      Removed in 2 surgeries due to cancer.  TONSILLECTOMY          SOCIAL HISTORY:     Social History     Socioeconomic History    Marital status:      Spouse name: Not on file    Number of children: Not on file    Years of education: Not on file    Highest education level: Not on file   Occupational History    Not on file   Tobacco Use    Smoking status: Never Smoker    Smokeless tobacco: Never Used   Vaping Use    Vaping Use: Never used   Substance and Sexual Activity    Alcohol use: Yes     Comment: rarely    Drug use: No    Sexual activity: Not Currently     Partners: Male   Other Topics Concern    Not on file   Social History Narrative    Not on file     Social Determinants of Health     Financial Resource Strain:     Difficulty of Paying Living Expenses: Not on file   Food Insecurity:     Worried About Running Out of Food in the Last Year: Not on file    Rafael of Food in the Last Year: Not on file   Transportation Needs:     Lack of Transportation (Medical): Not on file    Lack of Transportation (Non-Medical):  Not on file   Physical Activity:     Days of Exercise per Week: Not on file    Minutes of Exercise per Session: Not on file   Stress:     Feeling of Stress : Not on file   Social Connections:     Frequency of Communication with Friends and Family: Not on file    Frequency of Social Gatherings with Friends and Family: Not on file    Attends Nondenominational Services: Not on file    Active Member of Clubs or Organizations: Not on file    Attends Club or Organization Meetings: Not on file    Marital Status: Not on file   Intimate Partner Violence:     Fear of Current or Ex-Partner: Not on file    Emotionally Abused: Not on file    Physically episodic nausea and vomiting.   - Was admitted from 11/1 to 11/15/22, underwent EGD on 12/3/22 by Netta -found to have small superficial duodenal ulcers. Prilosec was changed to Protonix BID.   - she has continued to have intermittent nausea and vomiting and has not been able to take her medications or maintain good oral intake.   - [facility] consulted.  As next step, CT abdomen/pelvis with IV and oral contrast could be obtained.  This was deferred as patient is choosing hospice at discharge.  - Continue Protonix p.o. twice daily dose   - PRN antiemetics  - Overall appetite has improved     Diabetes mellitus type II with long-term use of insulin, with peripheral neuropathy  - PTA on Lantus and NovoLog  - has not been taking insulin due to poor PO intake  - Blood sugars better controlled with Lantus.   - continue lantus 8 units daily   - Continue sliding scale insulin  - Monitor blood sugars       Essential hypertension  Dyslipidemia  Chronic paroxysmal atrial fibrillation  Chronic diastolic CHF with preserved EF of 65-70%, echo 12/15/2022  - Blood pressure ok   - Continue diltiazem, Imdur, spironolactone  - hold off on PTA Bumex given poor PO intake and dehydration  - Discontinued rosuvastatin  - continue Eliquis       Hypothyroidism  - continue PTA Synthroid     Anxiety/depression  Parkinson's disease  -Continue Sinemet, Celexa, Wellbutrin, Aricept, Topamax  -Hold trazodone     Asthma/COPD  - respiratory status stable despite saddle PE  - Continue inhalers including Symbicort     CKD stage III  -BUN 37, creatinine 0.89;  stable   -Monitor     Hypokalemia, potassium 3  -Placed on potassium replacement protocol  -Potassium replaced    Hypovolemic hyponatremia: Na = 134 mmol/L   -Improved to 142 with IV fluids.  Now down to 137    Mild hypercalcemia: corrected calcium is >10.1, most likely secondary to hypovolemia  -Now improved and in normal range    Hypoalbuminemia: albumin = 2.8 g/dL  - monitor as appropriate               Obesity: Estimated body mass index is 32.72 kg/m  as calculated from the following:    Height as of this encounter: 1.524 m (5').    Weight as of this encounter: 76 kg (167 lb 8.8 oz)., PRESENT ON ADMISSION        Goals of care  Patient currently lives at home.  Her  is unable to care for her.  Palliative care consulted with patient on 12/15/2021.  She wants comfort focused care and wants to enroll in hospice after discharge.  She does not want invasive interventions but is okay with medical management.           Diet: Combination Diet Regular Diet Adult  Snacks/Supplements Adult: Glucerna; Between Meals    DVT Prophylaxis: Lovenox  Hussein Catheter: PRESENT, indication:    Central Lines: None  Cardiac Monitoring: None  Code Status: No CPR- Do NOT Intubate      Disposition Plan     Expected Discharge Date: 12/21/2022                The patient's care was discussed with the Bedside Nurse and Patient.    Whitney Lyles MD  Hospitalist Service  [facility]  Securely message with the Vocera Web Console (learn more here)  Text page via [employer] Paging/Directory         Clinically Significant Risk Factors              # Hypoalbuminemia: Lowest albumin = 2.2 g/dL at 12/18/2022 11:28 AM, will monitor as appropriate   # Thrombocytopenia: Lowest platelets = 98 in last 2 days, will monitor for bleeding          # Obesity: Estimated body mass index is 33.88 kg/m  as calculated from the following:    Height as of this encounter: 1.524 m (5').    Weight as of this encounter: 78.7 kg (173 lb 8 oz).          ______________________________________________________________________    Interval History   Patient more alert today.  Sitting in bed. Is wondering about discharge.  No other complains  No chest pain or shortness of breath  No nausea or vomiting         Data reviewed today: I reviewed all medications, new labs and imaging results over the last 24 hours. I personally reviewed no images or EKG's  Abused: Not on file    Sexually Abused: Not on file   Housing Stability:     Unable to Pay for Housing in the Last Year: Not on file    Number of Places Lived in the Last Year: Not on file    Unstable Housing in the Last Year: Not on file       CURRENT MEDICATIONS:     Current Outpatient Medications   Medication Sig Dispense Refill    venlafaxine (EFFEXOR XR) 37.5 MG extended release capsule Take 1 capsule by mouth daily 90 capsule 3    Misc Natural Products (HIMALAYAN GOJI PO) Take by mouth      vitamin D3 (CHOLECALCIFEROL) 25 MCG (1000 UT) TABS tablet Take 1 tablet by mouth daily 90 tablet 1    cyclobenzaprine (FLEXERIL) 5 MG tablet Take 5 mg by mouth nightly as needed (Patient not taking: Reported on 9/28/2021)      oxybutynin (DITROPAN-XL) 10 MG extended release tablet Take 1 tablet by mouth daily 90 tablet 3    amLODIPine (NORVASC) 5 MG tablet       Magnesium 500 MG TABS Take 500 mg by mouth daily       COD LIVER OIL PO Take by mouth daily      levothyroxine (SYNTHROID) 150 MCG tablet Take 1 tablet by mouth daily.  Cranberry 400 MG TABS Take 400 mg by mouth 2 times daily Takes Cranactin from [facility] ordered by urology        No current facility-administered medications for this visit. ALLERGIES:     Allergies   Allergen Reactions    No Known Allergies                                  REVIEW OF SYSTEMS        All items selected indicate a positive finding. Those items not selected are negative.   Constitutional [] Weight loss/gain   [] Fatigue  [] Fever/Chills   HEENT [] Hearing Loss  [] Visual Disturbance  [] Tinnitus  [] Eye pain   Respiratory [] Shortness of Breath  [] Cough  [] Snoring   Cardiovascular [] Chest Pain  [] Palpitations  [] Lightheaded   GI [] Constipation  [] Diarrhea  [] Swallowing change  [] Nausea/vomiting    [] Urinary Frequency  [] Urinary Urgency   Musculoskeletal [] Neck pain  [] Back pain  [] Muscle pain  [] Restless legs   Dermatologic [] Skin changes Neurologic [] Memory loss/confusion  [] Seizures  [] Trouble walking or imbalance  [] Dizziness  [] Sleep disturbance  [] Weakness  [] Numbness  [] Tremors  [] Speech Difficulty  [] Headaches  [] Light Sensitivity  [] Sound Sensitivity   Endocrinology []Excessive thirst  []Excessive hunger   Psychiatric [] Anxiety/Depression  [] Hallucination   Allergy/immunology []Hives/environmental allergies   Hematologic/lymph [] Abnormal bleeding  [] Abnormal bruising         PHYSICAL EXAMINATION:       There were no vitals filed for this visit.                                            .                                                                                                    General Appearance:  Alert, cooperative, no signs of distress, appears stated age   Head:  Normocephalic, no signs of trauma   Eyes:  Conjunctiva/corneas clear;  eyelids intact   Ears:  Normal external ear and canals   Nose: Nares normal, mucosa normal, no drainage    Throat: Lips and tongue normal; teeth normal;  gums normal   Neck: Supple, intact flexion, extension and rotation;   trachea midline;  no adenopathy;   thyroid: not enlarged;   no carotid pulse abnormality   Back:   Symmetric, no curvature, ROM adequate   Lungs:   Respirations unlabored   Heart:  Regular rate and rhythm           Extremities: Extremities normal, no cyanosis, no edema   Pulses: Symmetric over head and neck   Skin: Skin color, texture normal, no rashes, no lesions                                     NEUROLOGIC EXAMINATION    Neurologic Exam  Mental status    Alert and oriented x 3; intact memory with no confusion, speech or language problems; no hallucinations or delusions  Fund of information appropriate for level of education    Cranial nerves    II - visual fields intact to confrontation bilaterally  III, IV, VI - extra-ocular muscles full: no pupillary defect; no MAREK, no nystagmus, no ptosis   V - normal facial sensation today.      Physical Exam   Vital Signs: Temp: 98.8  F (37.1  C) Temp src: Oral BP: 137/70 Pulse: 78   Resp: 18 SpO2: 94 % O2 Device: None (Room air)    Weight: 173 lbs 8.03 oz    Constitutional-patient is awake and alert, sitting in bed, in no acute distress  Cardiovascular-regular rate and rhythm, no murmurs, no edema  Pulmonary-lungs are clear to auscultation bilaterally, no wheezing or rhonchi  GI-abdomen is soft, nontender, nondistended, no hepatosplenomegaly or masses  Integumentary-skin is warm and dry, no rashes or ulcers  Neurological- Moving all 4 extremities, normal speech, no focal deficits    Data   Recent Labs   Lab 12/20/22  0941 12/20/22  0705 12/20/22  0153 12/19/22  2102 12/19/22  1308 12/19/22  0706 12/18/22  1809 12/18/22  1128 12/17/22  1303 12/17/22  1200 12/15/22  1812 12/15/22  1207 12/14/22  2351 12/14/22  1523 12/14/22  1340   0000   WBC  --  6.3  --   --   --  5.5  --  5.5  --   --   --   --   --  9.5  --    < >   HGB  --  10.1*  --   --   --  10.5*  --  10.3*  --   --   --   --   --  13.2  --    < >   MCV  --  81  --   --   --  81  --  81  --   --   --   --   --  82  --    < >   PLT  --  192  --   --   --  187  --  98*  --  152  --   --   --  174  --    < >   INR  --   --   --   --   --   --   --   --   --   --   --   --   --  1.28*  --   --    NA  --   --   --   --   --  136  --  136  --   --   --  142  --   --  134  --    POTASSIUM  --   --   --   --   --  3.8  --  4.1  --  3.5   < > 3.0*  --   --  4.3  --    CHLORIDE  --   --   --   --   --  106  --  106  --   --   --  106  --   --  96  --    CO2  --   --   --   --   --  22  --  23  --   --   --  23  --   --  25  --    BUN  --   --   --   --   --  11  --  13  --   --   --  23  --   --  37*  --    CR  --   --   --   --   --  0.89  --  0.92  --   --   --  0.89  --   --  1.14*  --    ANIONGAP  --   --   --   --   --  8  --  7  --   --   --  13  --   --  13  --    CHRISTOPHER  --   --   --   --   --  8.5  --  8.5  --   --   --  8.5  --   --  9.5   --    *  --  158* 204*   < > 111*   < > 125*   < >  --    < > 133*   < >  --  274*  --    ALBUMIN  --   --   --   --   --   --   --  2.2*  --   --   --   --   --   --  2.8*  --    PROTTOTAL  --   --   --   --   --   --   --  4.9*  --   --   --   --   --   --  6.7*  --    BILITOTAL  --   --   --   --   --   --   --  0.4  --   --   --   --   --   --  1.3  --    ALKPHOS  --   --   --   --   --   --   --  101  --   --   --   --   --   --  173*  --    ALT  --   --   --   --   --   --   --  7  --   --   --   --   --   --  11  --    AST  --   --   --   --   --   --   --  21  --   --   --   --   --   --  36  --     < > = values in this interval not displayed.     No results found for this or any previous visit (from the past 24 hour(s)).  Medications     - MEDICATION INSTRUCTIONS -         apixaban ANTICOAGULANT  5 mg Oral BID     buPROPion  300 mg Oral QAM     carbidopa-levodopa  1 tablet Oral At Bedtime     carbidopa-levodopa  2 tablet Oral 4x Daily     citalopram  40 mg Oral Daily     diltiazem ER COATED BEADS  180 mg Oral Daily     donepezil  10 mg Oral At Bedtime     fluticasone  2 spray Both Nostrils Daily     fluticasone-vilanterol  1 puff Inhalation Daily     insulin aspart  1-7 Units Subcutaneous TID AC     insulin aspart  1-5 Units Subcutaneous At Bedtime     insulin glargine  8 Units Subcutaneous QAM AC     isosorbide mononitrate  30 mg Oral Daily     levothyroxine  88 mcg Oral QAM AC     naltrexone  4 mg Oral At Bedtime     pantoprazole  40 mg Oral BID AC     [Held by provider] pregabalin  25 mg Oral TID     sodium chloride (PF)  3 mL Intracatheter Q8H     spironolactone  25 mg Oral Daily     topiramate  50 mg Oral TID     [Held by provider] traZODone   mg Oral At Bedtime      VII - normal facial symmetry                                                             VIII - intact hearing                                                                             IX, X - symmetrical palate                                                                  XI - symmetrical shoulder shrug                                                       XII - tongue midline without atrophy or fasciculation      Motor function  Normal muscle bulk and tone; strength 5/5 on all 4 extremities, no pronator drift      Sensory function  diminished light touch, vibration, temperature, and vibration in her bilateral lower extremities      Cerebellar Intact fine motor movement. No involuntary movements or tremors. No ataxia or dysmetria on finger to nose or heel to shin testing      Reflex function DTR 2+ on bilateral UE and LE, symmetric. Down going toes bilaterally      Gait                   normal base and arm swing                  Medical Decision Making: In summary, your patient, Eleanor Brandt exhibits the following, with associated plan:    1. Relapsing remitting multiple sclerosis. She has been off of disease modifying therapy since February 2020. She stopped it on her own and does not want to be on any additional medications. Her last MRIs were in February 2020.  1. Obtain MRI of the brain and cervical spine with and without contrast.  2. At this time, if there are no new or active lesions on her MRIs, I would agree that staying off of disease modifying therapy at her age would be reasonable. 3. The patient will return for reevaluation in 6 months  2. Depression, which is relatively well controlled. The patient is requesting a lower dose of her Effexor. 1. Reduce Effexor to 37.5 mg.  3. Vitamin D deficiency  1.  Continue vitamin D 1000 units daily            Signed: Jonah Hassan CNP      *Please note that portions of this note were completed with a voice recognition program.  Although every effort was made to insure the accuracy of this automated transcription, some errors in transcription may have occurred, occasionally words and are mis-transcribed    Provider Attestation: The documentation recorded by the scribe accurately reflects the service I personally performed and the decisions made by myself. Portions of this note were transcribed by a scribe. I personally performed the history, physical exam, and medical decision-making and confirm the accuracy of the information in the transcribed note. Scribe Attestation:   By signing my name below, I, URMILA Morgan CNP, attest that this documentation has been prepared under the direction and in the presence of Katlin Armstrong CNP.

## 2023-03-13 DIAGNOSIS — G35 RELAPSING REMITTING MULTIPLE SCLEROSIS (HCC): ICD-10-CM

## 2023-03-13 DIAGNOSIS — F32.A DEPRESSION, UNSPECIFIED DEPRESSION TYPE: ICD-10-CM

## 2023-03-13 NOTE — TELEPHONE ENCOUNTER
Pharmacy requesting refill of venlafaxine 75 mg.       Medication active on med list yes      Date of last Rx: 12/12/2022 with 0 refills          verified by PAULIE LENTZA      Date of last appointment 12/13/2022    Next Visit Date:  4/13/2023

## 2023-03-14 RX ORDER — VENLAFAXINE HYDROCHLORIDE 75 MG/1
CAPSULE, EXTENDED RELEASE ORAL
Qty: 90 CAPSULE | Refills: 1 | Status: SHIPPED | OUTPATIENT
Start: 2023-03-14

## 2023-04-27 ENCOUNTER — TELEPHONE (OUTPATIENT)
Dept: NEUROLOGY | Age: 73
End: 2023-04-27

## 2023-05-09 DIAGNOSIS — G35 RELAPSING REMITTING MULTIPLE SCLEROSIS (HCC): ICD-10-CM

## 2023-05-09 RX ORDER — INTERFERON BETA-1A 30MCG/.5ML
KIT INTRAMUSCULAR
Qty: 4 EACH | Refills: 0 | Status: SHIPPED | OUTPATIENT
Start: 2023-05-09

## 2023-05-09 NOTE — TELEPHONE ENCOUNTER
Pharmacy requesting refill of Avonex. Medication active on med list yes      Date of last fill: 5/15/22  verified on 5/9/2023   verified by [facility] LPN      Date of last appointment 12/13/22 with Zahida Prabhakar    Next Visit Date:  5/16/2023 with you. Please approve for the pt as she it out of medication and has an appt scheduled with you on 5/16/23.

## 2023-05-16 ENCOUNTER — OFFICE VISIT (OUTPATIENT)
Dept: NEUROLOGY | Age: 73
End: 2023-05-16
Payer: MEDICARE

## 2023-05-16 ENCOUNTER — TELEPHONE (OUTPATIENT)
Dept: NEUROLOGY | Age: 73
End: 2023-05-16

## 2023-05-16 VITALS
SYSTOLIC BLOOD PRESSURE: 145 MMHG | HEART RATE: 66 BPM | HEIGHT: 68 IN | BODY MASS INDEX: 23.16 KG/M2 | WEIGHT: 152.8 LBS | DIASTOLIC BLOOD PRESSURE: 78 MMHG

## 2023-05-16 DIAGNOSIS — N31.9 NEUROGENIC BLADDER: ICD-10-CM

## 2023-05-16 DIAGNOSIS — F32.A DEPRESSION, UNSPECIFIED DEPRESSION TYPE: ICD-10-CM

## 2023-05-16 DIAGNOSIS — F33.40 RECURRENT MAJOR DEPRESSIVE DISORDER, IN REMISSION (HCC): ICD-10-CM

## 2023-05-16 DIAGNOSIS — G35 RELAPSING REMITTING MULTIPLE SCLEROSIS (HCC): Primary | ICD-10-CM

## 2023-05-16 DIAGNOSIS — E07.9 THYROID DISORDER: ICD-10-CM

## 2023-05-16 PROCEDURE — 99215 OFFICE O/P EST HI 40 MIN: CPT | Performed by: PSYCHIATRY & NEUROLOGY

## 2023-05-16 PROCEDURE — 1123F ACP DISCUSS/DSCN MKR DOCD: CPT | Performed by: PSYCHIATRY & NEUROLOGY

## 2023-05-16 RX ORDER — INTERFERON BETA-1A 30MCG/.5ML
KIT INTRAMUSCULAR
Qty: 4 EACH | Refills: 5 | Status: SHIPPED | OUTPATIENT
Start: 2023-05-16

## 2023-05-16 RX ORDER — AMOXICILLIN 500 MG/1
CAPSULE ORAL
COMMUNITY
Start: 2023-04-21 | End: 2023-05-16

## 2023-05-16 RX ORDER — VENLAFAXINE HYDROCHLORIDE 75 MG/1
75 CAPSULE, EXTENDED RELEASE ORAL DAILY
Qty: 90 CAPSULE | Refills: 1 | Status: SHIPPED | OUTPATIENT
Start: 2023-05-16

## 2023-05-16 RX ORDER — TIZANIDINE 4 MG/1
4 TABLET ORAL EVERY 6 HOURS PRN
COMMUNITY

## 2023-05-16 NOTE — PROGRESS NOTES
NEUROLOGY CONSULT  Patient Name:       Ester Silva  :        1950  Clinic Visit Date:    2023      Dear Dr. Meryl Goltz, MD     I had the opportunity to see your patient, Ms. Ester Silva in neurology consultation today. As you know she  is a She is a 72 y.o.  female  presented for establishment of continued neurologic care. ; transitioning from retiring colleague, Nelli Shanks CNP. Extended time spent while reviewing prior medical records and prior diagnostic studies of the patient. As you know, Ms. Ester Silva  is a 72 y.o.  female with chronic relapsing remitting multiple sclerosis since . Patient was not on disease modifying therapy as per her wishes until 2022,  when she had MRI thoracic spine showing acute dimension from T1-T3. Ana Course had discussed with her extensively and patient only was agreeable to take Avonex. Then she was restarted on Avonex 30 mcg sq once weekly. Patient has tolerated it well without any expected adverse effects. Denied any breakthrough infections and metabolic abnormalities, etc.  Upon review of systems; she has had dyspepsia for which she has been on Effexor 37.5 mg daily which was increased to 75 mg daily during last visit in . Multiple Sclerosis Disease Course  Onset of Symptoms: Date of onset:  (initial right optic neuritis along with lower extremity numbness and gait difficulties)   Date of diagnosis of MS:    Disease course at onset: Relapsing-Remitting  Current disease course: Relapsing-Remitting  Previous disease therapies: Avonex (-2020)  Current disease therapy: Avonex (since 2022)  CSF: done many yrs ago; complete details not available; old record notes say \"elevated IgG synthesis\".    Vitamin D:  39.8 (2019)  63 (2021)  Smoking status:  never smoked  EDSS: 2.5 with pyramidal dysfx & bladder dysfunction & gait disturbance        All items selected indicate a

## 2023-05-16 NOTE — TELEPHONE ENCOUNTER
The lab orders printed out after the patient left the office for a TSH, LMOM for patient and I mailed them to her home.  KS

## 2023-05-28 DIAGNOSIS — G35 RELAPSING REMITTING MULTIPLE SCLEROSIS (HCC): ICD-10-CM

## 2023-05-30 RX ORDER — INTERFERON BETA-1A 30MCG/.5ML
KIT INTRAMUSCULAR
Qty: 1 EACH | Refills: 0 | OUTPATIENT
Start: 2023-05-30

## 2023-05-31 DIAGNOSIS — G35 RELAPSING REMITTING MULTIPLE SCLEROSIS (HCC): ICD-10-CM

## 2023-05-31 NOTE — TELEPHONE ENCOUNTER
[facility] called in asking for the refill of pt's Avonex. I informed him that it was sent in error to [facility]. He is asking if we can send it to them. This was done. WAS SENT IN ERROR ON 5/16/23 TO [facility]. PLEASE APPROVE.

## 2023-06-02 RX ORDER — INTERFERON BETA-1A 30MCG/.5ML
KIT INTRAMUSCULAR
Qty: 4 EACH | Refills: 5 | Status: SHIPPED | OUTPATIENT
Start: 2023-06-02

## 2023-06-22 LAB
BUN / CREAT RATIO: NORMAL
BUN BLDV-MCNC: 14 MG/DL
CREAT SERPL-MCNC: 0.69 MG/DL

## 2023-06-29 ENCOUNTER — TELEPHONE (OUTPATIENT)
Dept: NEUROLOGY | Age: 73
End: 2023-06-29

## 2023-08-30 ENCOUNTER — OFFICE VISIT (OUTPATIENT)
Age: 73
End: 2023-08-30
Payer: MEDICARE

## 2023-08-30 VITALS — WEIGHT: 152 LBS | HEIGHT: 68 IN | BODY MASS INDEX: 23.04 KG/M2

## 2023-08-30 DIAGNOSIS — N39.0 RECURRENT UTI: ICD-10-CM

## 2023-08-30 DIAGNOSIS — N32.81 OAB (OVERACTIVE BLADDER): Primary | ICD-10-CM

## 2023-08-30 DIAGNOSIS — N39.41 URGE INCONTINENCE: ICD-10-CM

## 2023-08-30 LAB
BILIRUBIN, POC: NORMAL
BLOOD URINE, POC: NORMAL
CLARITY, POC: CLEAR
COLOR, POC: YELLOW
GLUCOSE URINE, POC: NORMAL
KETONES, POC: NORMAL
LEUKOCYTE EST, POC: NORMAL
NITRITE, POC: NORMAL
PH, POC: NORMAL
PROTEIN, POC: NORMAL
SPECIFIC GRAVITY, POC: NORMAL
UROBILINOGEN, POC: NORMAL

## 2023-08-30 PROCEDURE — 99214 OFFICE O/P EST MOD 30 MIN: CPT | Performed by: SPECIALIST

## 2023-08-30 PROCEDURE — 81003 URINALYSIS AUTO W/O SCOPE: CPT | Performed by: SPECIALIST

## 2023-08-30 PROCEDURE — 1123F ACP DISCUSS/DSCN MKR DOCD: CPT | Performed by: SPECIALIST

## 2023-08-30 RX ORDER — LACTULOSE 10 G/15ML
SOLUTION ORAL
COMMUNITY

## 2023-08-30 RX ORDER — OXYBUTYNIN CHLORIDE 10 MG/1
10 TABLET, EXTENDED RELEASE ORAL DAILY
Qty: 90 TABLET | Refills: 3 | Status: SHIPPED | OUTPATIENT
Start: 2023-08-30

## 2023-08-30 NOTE — PROGRESS NOTES
[facility] [address] [place] [address] Urology Office Progress Note    Patient:  Isac Clemens  YOB: 1950  Date: 8/30/2023    HISTORY OF PRESENT ILLNESS:   The patient is a 72 y.o. female  Patient's overactive bladder symptoms and Urge urinary incontinence are well controlled with Oxybutynin ER 10 mg po qd. Patient using Cranberry pills po bid for UTI prevention. She has not had any recent UTI's. Urine for C&S each and every time patient thinks she has a UTI. Lower urinary tract symptoms: urgency, frequency, hesitancy, decreased urinary stream, nocturia, 1 times per night, and incomplete emptying and straining.    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, 9mm on 8/9/19 U/S    Additional History: none    Procedures Today: N/A    Urinalysis today:  Results for POC orders placed in visit on 08/30/23   POCT Urinalysis No Micro (Auto)   Result Value Ref Range    Color, UA yellow     Clarity, UA clear     Glucose, UA POC neg     Bilirubin, UA      Ketones, UA      Spec Grav, UA      Blood, UA POC neg     pH, UA      Protein, UA POC neg     Urobilinogen, UA      Leukocytes, UA small     Nitrite, UA neg          Last BUN and creatinine:  Lab Results   Component Value Date    BUN 14 06/22/2023     Lab Results   Component Value Date    CREATININE 0.69 06/22/2023       Last CBC:  Lab Results   Component Value Date    WBC 5.4 07/20/2015    HGB 12.3 07/20/2015    HCT 37.5 07/20/2015    MCV 95.9 07/20/2015     07/20/2015       Additional Lab/Culture results: none    Imaging Reviewed during this Office Visit: none  (results were independently reviewed by physician and radiology report

## 2023-09-26 ENCOUNTER — OFFICE VISIT (OUTPATIENT)
Dept: NEUROLOGY | Age: 73
End: 2023-09-26
Payer: MEDICARE

## 2023-09-26 VITALS
HEIGHT: 68 IN | SYSTOLIC BLOOD PRESSURE: 144 MMHG | WEIGHT: 152 LBS | HEART RATE: 72 BPM | BODY MASS INDEX: 23.04 KG/M2 | DIASTOLIC BLOOD PRESSURE: 94 MMHG

## 2023-09-26 DIAGNOSIS — G35 RELAPSING REMITTING MULTIPLE SCLEROSIS (HCC): Primary | ICD-10-CM

## 2023-09-26 DIAGNOSIS — F33.40 RECURRENT MAJOR DEPRESSIVE DISORDER, IN REMISSION (HCC): ICD-10-CM

## 2023-09-26 DIAGNOSIS — F32.A DEPRESSION, UNSPECIFIED DEPRESSION TYPE: ICD-10-CM

## 2023-09-26 PROCEDURE — 99214 OFFICE O/P EST MOD 30 MIN: CPT | Performed by: PSYCHIATRY & NEUROLOGY

## 2023-09-26 PROCEDURE — 1123F ACP DISCUSS/DSCN MKR DOCD: CPT | Performed by: PSYCHIATRY & NEUROLOGY

## 2023-09-26 RX ORDER — VENLAFAXINE HYDROCHLORIDE 75 MG/1
75 CAPSULE, EXTENDED RELEASE ORAL DAILY
Qty: 90 CAPSULE | Refills: 1 | Status: SHIPPED | OUTPATIENT
Start: 2023-09-26

## 2023-09-26 NOTE — PROGRESS NOTES
smoked  EDSS: 2.5 with pyramidal dysfx & bladder dysfunction & gait disturbance        All items selected indicate a positive finding. Those items not selected are negative.   Constitutional [] Weight loss/gain   [] Fatigue  [] Fever/Chills   HEENT [] Hearing Loss  [] Visual Disturbance  [] Tinnitus  [] Eye pain   Respiratory [] Shortness of Breath  [] Cough  [] Snoring   Cardiovascular [] Chest Pain  [] Palpitations  [] Lightheaded   GI [] Constipation  [] Diarrhea  [] Swallowing change    [] Urinary Frequency  [] Urinary Urgency   Musculoskeletal [] Neck pain  [] Back pain  [] Muscle pain  [] Restless legs   Dermatologic [] Skin changes   Neurologic [] Memory loss/confusion  [] Seizures  [] Trouble walking or imbalance  [] Dizziness  [] Weakness  [] Numbness  [] Tremors  [] Speech Difficulty  [] Headaches  [] Light Sensitivity  [] Sound Sensitivity   Endocrinology []Excessive thirst  []Excessive hunger   Psychiatric [x] Anxiety/Depression  [] Hallucination   Allergy/immunology []Hives/environmental allergies   Hematologic/lymph [] Abnormal bleeding  [] Abnormal bruising     9HPT:  T25W:  Current Outpatient Medications on File Prior to Visit   Medication Sig Dispense Refill    lactulose (CHRONULAC) 10 GM/15ML solution TAKE 15-30ML BY MOUTH ONCE TO TWICE DAILY      oxybutynin (DITROPAN-XL) 10 MG extended release tablet Take 1 tablet by mouth daily 90 tablet 3    Interferon Beta-1a (AVONEX PEN) 30 MCG/0.5ML AJKT Inject once a week 4 each 5    tiZANidine (ZANAFLEX) 4 MG tablet Take 1 tablet by mouth every 6 hours as needed      venlafaxine (EFFEXOR XR) 75 MG extended release capsule Take 1 capsule by mouth daily 90 capsule 1    vitamin D3 (CHOLECALCIFEROL) 25 MCG (1000 UT) TABS tablet Take 1 tablet by mouth daily 90 tablet 1    amLODIPine (NORVASC) 5 MG tablet       Magnesium 500 MG TABS Take 500 mg by mouth daily       COD LIVER OIL PO Take by mouth daily      levothyroxine (SYNTHROID) 150 MCG tablet Take 1

## 2023-11-13 DIAGNOSIS — F32.A DEPRESSION, UNSPECIFIED DEPRESSION TYPE: ICD-10-CM

## 2023-11-13 DIAGNOSIS — G35 RELAPSING REMITTING MULTIPLE SCLEROSIS (HCC): ICD-10-CM

## 2023-11-13 DIAGNOSIS — F33.40 RECURRENT MAJOR DEPRESSIVE DISORDER, IN REMISSION (HCC): ICD-10-CM

## 2023-11-13 NOTE — TELEPHONE ENCOUNTER
Pharmacy requesting refill of Avonex 30mcg/0.5mL.       Medication active on med list yes      Date of last Rx: 6/2/2023 with 5 refills          verified by Shannan Todd LPN      Date of last appointment 9/26/2023    Next Visit Date:  Visit date not found

## 2023-11-13 NOTE — TELEPHONE ENCOUNTER
Pharmacy requesting refill of venlafaxine 75 mg.       Medication active on med list yes      Date of last Rx: 9/26/2023 with 1 refills          verified by NANCY LENTZ      Date of last appointment 9/26/2023    Next Visit Date:  Visit date not found

## 2023-11-17 RX ORDER — VENLAFAXINE HYDROCHLORIDE 75 MG/1
75 CAPSULE, EXTENDED RELEASE ORAL DAILY
Qty: 90 CAPSULE | Refills: 1 | OUTPATIENT
Start: 2023-11-17

## 2023-11-17 RX ORDER — INTERFERON BETA-1A 30MCG/.5ML
KIT INTRAMUSCULAR
Qty: 1 EACH | Refills: 5 | Status: SHIPPED | OUTPATIENT
Start: 2023-11-17

## 2023-11-20 NOTE — TELEPHONE ENCOUNTER
I called Temo and let her know. She voiced understanding. I did tell her she can stop in our office if she needs to and we can check it.

## 2023-11-29 ENCOUNTER — TELEPHONE (OUTPATIENT)
Dept: NEUROLOGY | Age: 73
End: 2023-11-29

## 2023-11-29 NOTE — TELEPHONE ENCOUNTER
11 29 2023 called patient times 2 (11 18 2023 and 11 22 2023 at [phone]) to schedule March of 2024 follow up appointment with Dr. Leigh Delgado both times, no response. I mailed the patient a letter asking them to call the office back to schedule this appointment.   KS

## 2024-05-05 DIAGNOSIS — G35 RELAPSING REMITTING MULTIPLE SCLEROSIS (HCC): ICD-10-CM

## 2024-05-06 RX ORDER — INTERFERON BETA-1A 30MCG/.5ML
KIT INTRAMUSCULAR
Qty: 1 EACH | Refills: 5 | Status: ACTIVE | OUTPATIENT
Start: 2024-05-06

## 2024-05-06 NOTE — TELEPHONE ENCOUNTER
Pharmacy requesting refill of Interferon Beta-1a (AVONEX PEN) 30 MCG/0.5ML AJKT      Medication active on med list yes      Date of last Rx: 11/17/2023 with 5 refills          verified by NANCY GOLDEN      Date of last appointment 09/26/2023    Next Visit Date:  Visit date not found

## 2024-08-16 LAB
BUN / CREAT RATIO: NORMAL
BUN BLDV-MCNC: 15 MG/DL
CREAT SERPL-MCNC: 0.91 MG/DL

## 2024-08-29 ENCOUNTER — OFFICE VISIT (OUTPATIENT)
Age: 74
End: 2024-08-29
Payer: MEDICARE

## 2024-08-29 VITALS — HEIGHT: 68 IN | WEIGHT: 152 LBS | BODY MASS INDEX: 23.04 KG/M2

## 2024-08-29 DIAGNOSIS — N39.41 URGE INCONTINENCE: ICD-10-CM

## 2024-08-29 DIAGNOSIS — N39.0 RECURRENT UTI: ICD-10-CM

## 2024-08-29 DIAGNOSIS — N32.81 OAB (OVERACTIVE BLADDER): Primary | ICD-10-CM

## 2024-08-29 PROCEDURE — 81003 URINALYSIS AUTO W/O SCOPE: CPT | Performed by: SPECIALIST

## 2024-08-29 PROCEDURE — 99214 OFFICE O/P EST MOD 30 MIN: CPT | Performed by: SPECIALIST

## 2024-08-29 PROCEDURE — 1123F ACP DISCUSS/DSCN MKR DOCD: CPT | Performed by: SPECIALIST

## 2024-08-29 RX ORDER — OXYBUTYNIN CHLORIDE 10 MG/1
10 TABLET, EXTENDED RELEASE ORAL DAILY
Qty: 90 TABLET | Refills: 3 | Status: SHIPPED | OUTPATIENT
Start: 2024-08-29

## 2024-08-29 NOTE — PROGRESS NOTES
Vincent Daniel MD [facility] Urology Office Progress Note    Patient:  Temo Rosario  YOB: 1950  Date: 8/29/2024    HISTORY OF PRESENT ILLNESS:   The patient is a 74 y.o. female  Patient doing well using Oxybutynin ER 10 mg po qd for OAB symptoms.  She does not have Urge urinary incontinence on this medication.   Patient has not had any exacerbations of her multiple sclerosis.  Patient using Cranberry pills po bid for UTI prevention.   Urine for C&S each and every time patient thinks she has a UTI.   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/S, 9mm on 8/9/19 U/S    Additional History: none    Procedures Today: N/A    Urinalysis today:  Results for POC orders placed in visit on 08/29/24   POCT Urinalysis No Micro (Auto)   Result Value Ref Range    Color, UA yellow     Clarity, UA clear     Glucose, UA POC neg     Bilirubin, UA      Ketones, UA      Spec Grav, UA      Blood, UA POC neg     pH, UA      Protein, UA POC neg     Urobilinogen, UA      Leukocytes, UA small     Nitrite, UA neg        Last BUN and creatinine:  Lab Results   Component Value Date    BUN 15 08/16/2024     Lab Results   Component Value Date    CREATININE 0.91 08/16/2024       Last CBC:  Lab Results   Component Value Date    WBC 5.4 07/20/2015    HGB 12.3 07/20/2015    HCT 37.5 07/20/2015    MCV 95.9 07/20/2015     07/20/2015       Additional Lab/Culture results: none    Imaging Reviewed during this Office Visit: none  (results were independently reviewed by physician and radiology

## 2024-09-04 DIAGNOSIS — N32.81 OAB (OVERACTIVE BLADDER): Primary | ICD-10-CM

## 2024-09-04 DIAGNOSIS — N39.41 URGE INCONTINENCE: ICD-10-CM

## 2024-09-04 RX ORDER — OXYBUTYNIN CHLORIDE 10 MG/1
10 TABLET, EXTENDED RELEASE ORAL DAILY
Qty: 90 TABLET | Refills: 3 | Status: SHIPPED | OUTPATIENT
Start: 2024-09-04

## 2024-10-16 DIAGNOSIS — G35 RELAPSING REMITTING MULTIPLE SCLEROSIS (HCC): ICD-10-CM

## 2024-10-16 RX ORDER — INTERFERON BETA-1A 30MCG/.5ML
KIT INTRAMUSCULAR
Qty: 1 EACH | Refills: 0 | OUTPATIENT
Start: 2024-10-16

## 2025-05-21 NOTE — TELEPHONE ENCOUNTER
Call placed to the patient and a message left on her her VM asking for a return call. normal balance